# Patient Record
Sex: FEMALE | Race: WHITE | NOT HISPANIC OR LATINO | Employment: OTHER | URBAN - METROPOLITAN AREA
[De-identification: names, ages, dates, MRNs, and addresses within clinical notes are randomized per-mention and may not be internally consistent; named-entity substitution may affect disease eponyms.]

---

## 2024-08-24 ENCOUNTER — APPOINTMENT (OUTPATIENT)
Dept: GENERAL RADIOLOGY | Facility: CLINIC | Age: 44
End: 2024-08-24
Attending: EMERGENCY MEDICINE
Payer: COMMERCIAL

## 2024-08-24 ENCOUNTER — HOSPITAL ENCOUNTER (INPATIENT)
Facility: CLINIC | Age: 44
LOS: 3 days | Discharge: HOME | End: 2024-08-27
Attending: EMERGENCY MEDICINE | Admitting: HOSPITALIST
Payer: COMMERCIAL

## 2024-08-24 DIAGNOSIS — R07.9 LEFT-SIDED CHEST PAIN: ICD-10-CM

## 2024-08-24 DIAGNOSIS — J45.901 EXACERBATION OF ASTHMA, UNSPECIFIED ASTHMA SEVERITY, UNSPECIFIED WHETHER PERSISTENT: ICD-10-CM

## 2024-08-24 LAB
ALBUMIN SERPL BCG-MCNC: 4.1 G/DL (ref 3.5–5.2)
ALBUMIN UR-MCNC: 20 MG/DL
ALP SERPL-CCNC: 61 U/L (ref 40–150)
ALT SERPL W P-5'-P-CCNC: 13 U/L (ref 0–50)
ANION GAP SERPL CALCULATED.3IONS-SCNC: 13 MMOL/L (ref 7–15)
APPEARANCE UR: CLEAR
AST SERPL W P-5'-P-CCNC: 16 U/L (ref 0–45)
BACTERIA #/AREA URNS HPF: ABNORMAL /HPF
BASE EXCESS BLDV CALC-SCNC: -1 MMOL/L (ref -3–3)
BASE EXCESS BLDV CALC-SCNC: -3 MMOL/L (ref -3–3)
BASE EXCESS BLDV CALC-SCNC: -8 MMOL/L (ref -3–3)
BASOPHILS # BLD AUTO: 0.1 10E3/UL (ref 0–0.2)
BASOPHILS NFR BLD AUTO: 0 %
BILIRUB SERPL-MCNC: 0.2 MG/DL
BILIRUB UR QL STRIP: NEGATIVE
BUN SERPL-MCNC: 13.5 MG/DL (ref 6–20)
CALCIUM SERPL-MCNC: 9 MG/DL (ref 8.8–10.4)
CHLORIDE SERPL-SCNC: 100 MMOL/L (ref 98–107)
COLOR UR AUTO: ABNORMAL
CREAT SERPL-MCNC: 0.72 MG/DL (ref 0.51–0.95)
D DIMER PPP FEU-MCNC: 0.29 UG/ML FEU (ref 0–0.5)
EGFRCR SERPLBLD CKD-EPI 2021: >90 ML/MIN/1.73M2
EOSINOPHIL # BLD AUTO: 0 10E3/UL (ref 0–0.7)
EOSINOPHIL NFR BLD AUTO: 0 %
ERYTHROCYTE [DISTWIDTH] IN BLOOD BY AUTOMATED COUNT: 12.3 % (ref 10–15)
FLUAV RNA SPEC QL NAA+PROBE: NEGATIVE
FLUBV RNA RESP QL NAA+PROBE: NEGATIVE
GLUCOSE SERPL-MCNC: 118 MG/DL (ref 70–99)
GLUCOSE UR STRIP-MCNC: NEGATIVE MG/DL
HCO3 BLDV-SCNC: 17 MMOL/L (ref 21–28)
HCO3 BLDV-SCNC: 23 MMOL/L (ref 21–28)
HCO3 BLDV-SCNC: 26 MMOL/L (ref 21–28)
HCO3 SERPL-SCNC: 24 MMOL/L (ref 22–29)
HCT VFR BLD AUTO: 42.9 % (ref 35–47)
HGB BLD-MCNC: 14.7 G/DL (ref 11.7–15.7)
HGB UR QL STRIP: ABNORMAL
HOLD SPECIMEN: NORMAL
IMM GRANULOCYTES # BLD: 0 10E3/UL
IMM GRANULOCYTES NFR BLD: 0 %
KETONES UR STRIP-MCNC: 20 MG/DL
LACTATE BLD-SCNC: 2.4 MMOL/L
LACTATE BLD-SCNC: 2.8 MMOL/L
LACTATE BLD-SCNC: 5.7 MMOL/L
LACTATE SERPL-SCNC: 4.6 MMOL/L (ref 0.7–2)
LACTATE SERPL-SCNC: 6.1 MMOL/L (ref 0.7–2)
LEUKOCYTE ESTERASE UR QL STRIP: NEGATIVE
LYMPHOCYTES # BLD AUTO: 2.3 10E3/UL (ref 0.8–5.3)
LYMPHOCYTES NFR BLD AUTO: 19 %
MCH RBC QN AUTO: 31.6 PG (ref 26.5–33)
MCHC RBC AUTO-ENTMCNC: 34.3 G/DL (ref 31.5–36.5)
MCV RBC AUTO: 92 FL (ref 78–100)
MONOCYTES # BLD AUTO: 0.7 10E3/UL (ref 0–1.3)
MONOCYTES NFR BLD AUTO: 5 %
MUCOUS THREADS #/AREA URNS LPF: PRESENT /LPF
NEUTROPHILS # BLD AUTO: 9.3 10E3/UL (ref 1.6–8.3)
NEUTROPHILS NFR BLD AUTO: 75 %
NITRATE UR QL: NEGATIVE
NRBC # BLD AUTO: 0 10E3/UL
NRBC BLD AUTO-RTO: 0 /100
NT-PROBNP SERPL-MCNC: 425 PG/ML (ref 0–450)
PCO2 BLDV: 35 MM HG (ref 40–50)
PCO2 BLDV: 42 MM HG (ref 40–50)
PCO2 BLDV: 48 MM HG (ref 40–50)
PH BLDV: 7.3 [PH] (ref 7.32–7.43)
PH BLDV: 7.33 [PH] (ref 7.32–7.43)
PH BLDV: 7.34 [PH] (ref 7.32–7.43)
PH UR STRIP: 5.5 [PH] (ref 5–7)
PLATELET # BLD AUTO: 241 10E3/UL (ref 150–450)
PO2 BLDV: 26 MM HG (ref 25–47)
PO2 BLDV: 53 MM HG (ref 25–47)
PO2 BLDV: 57 MM HG (ref 25–47)
POTASSIUM SERPL-SCNC: 3.6 MMOL/L (ref 3.4–5.3)
PROCALCITONIN SERPL IA-MCNC: 0.03 NG/ML
PROT SERPL-MCNC: 6.8 G/DL (ref 6.4–8.3)
RBC # BLD AUTO: 4.65 10E6/UL (ref 3.8–5.2)
RBC URINE: 1 /HPF
RSV RNA SPEC NAA+PROBE: NEGATIVE
SAO2 % BLDV: 43 % (ref 70–75)
SAO2 % BLDV: 85 % (ref 70–75)
SAO2 % BLDV: 87 % (ref 70–75)
SARS-COV-2 RNA RESP QL NAA+PROBE: NEGATIVE
SODIUM SERPL-SCNC: 137 MMOL/L (ref 135–145)
SP GR UR STRIP: 1.03 (ref 1–1.03)
SQUAMOUS EPITHELIAL: 3 /HPF
TROPONIN T SERPL HS-MCNC: <6 NG/L
UROBILINOGEN UR STRIP-MCNC: NORMAL MG/DL
WBC # BLD AUTO: 12.4 10E3/UL (ref 4–11)
WBC URINE: 2 /HPF

## 2024-08-24 PROCEDURE — 85379 FIBRIN DEGRADATION QUANT: CPT | Performed by: EMERGENCY MEDICINE

## 2024-08-24 PROCEDURE — 250N000011 HC RX IP 250 OP 636: Performed by: EMERGENCY MEDICINE

## 2024-08-24 PROCEDURE — 84484 ASSAY OF TROPONIN QUANT: CPT | Performed by: EMERGENCY MEDICINE

## 2024-08-24 PROCEDURE — 120N000013 HC R&B IMCU

## 2024-08-24 PROCEDURE — 82803 BLOOD GASES ANY COMBINATION: CPT

## 2024-08-24 PROCEDURE — 87040 BLOOD CULTURE FOR BACTERIA: CPT | Performed by: EMERGENCY MEDICINE

## 2024-08-24 PROCEDURE — 94640 AIRWAY INHALATION TREATMENT: CPT

## 2024-08-24 PROCEDURE — 84145 PROCALCITONIN (PCT): CPT | Performed by: EMERGENCY MEDICINE

## 2024-08-24 PROCEDURE — 99285 EMERGENCY DEPT VISIT HI MDM: CPT | Mod: 25

## 2024-08-24 PROCEDURE — 36415 COLL VENOUS BLD VENIPUNCTURE: CPT | Performed by: HOSPITALIST

## 2024-08-24 PROCEDURE — 96365 THER/PROPH/DIAG IV INF INIT: CPT

## 2024-08-24 PROCEDURE — 80053 COMPREHEN METABOLIC PANEL: CPT | Performed by: EMERGENCY MEDICINE

## 2024-08-24 PROCEDURE — 99223 1ST HOSP IP/OBS HIGH 75: CPT | Performed by: HOSPITALIST

## 2024-08-24 PROCEDURE — 258N000003 HC RX IP 258 OP 636: Performed by: EMERGENCY MEDICINE

## 2024-08-24 PROCEDURE — 87637 SARSCOV2&INF A&B&RSV AMP PRB: CPT | Performed by: EMERGENCY MEDICINE

## 2024-08-24 PROCEDURE — 96368 THER/DIAG CONCURRENT INF: CPT

## 2024-08-24 PROCEDURE — 36415 COLL VENOUS BLD VENIPUNCTURE: CPT

## 2024-08-24 PROCEDURE — 96361 HYDRATE IV INFUSION ADD-ON: CPT

## 2024-08-24 PROCEDURE — 250N000011 HC RX IP 250 OP 636: Performed by: HOSPITALIST

## 2024-08-24 PROCEDURE — 83605 ASSAY OF LACTIC ACID: CPT | Performed by: HOSPITALIST

## 2024-08-24 PROCEDURE — 36415 COLL VENOUS BLD VENIPUNCTURE: CPT | Performed by: EMERGENCY MEDICINE

## 2024-08-24 PROCEDURE — 83605 ASSAY OF LACTIC ACID: CPT

## 2024-08-24 PROCEDURE — 85025 COMPLETE CBC W/AUTO DIFF WBC: CPT | Performed by: EMERGENCY MEDICINE

## 2024-08-24 PROCEDURE — 93005 ELECTROCARDIOGRAM TRACING: CPT

## 2024-08-24 PROCEDURE — 83880 ASSAY OF NATRIURETIC PEPTIDE: CPT | Performed by: EMERGENCY MEDICINE

## 2024-08-24 PROCEDURE — 87040 BLOOD CULTURE FOR BACTERIA: CPT | Performed by: HOSPITALIST

## 2024-08-24 PROCEDURE — 96375 TX/PRO/DX INJ NEW DRUG ADDON: CPT

## 2024-08-24 PROCEDURE — 81001 URINALYSIS AUTO W/SCOPE: CPT | Performed by: EMERGENCY MEDICINE

## 2024-08-24 PROCEDURE — 258N000003 HC RX IP 258 OP 636: Performed by: HOSPITALIST

## 2024-08-24 PROCEDURE — 250N000009 HC RX 250: Performed by: EMERGENCY MEDICINE

## 2024-08-24 PROCEDURE — 71046 X-RAY EXAM CHEST 2 VIEWS: CPT

## 2024-08-24 RX ORDER — LIDOCAINE 40 MG/G
CREAM TOPICAL
Status: DISCONTINUED | OUTPATIENT
Start: 2024-08-24 | End: 2024-08-27 | Stop reason: HOSPADM

## 2024-08-24 RX ORDER — SUMATRIPTAN 50 MG/1
50 TABLET, FILM COATED ORAL
COMMUNITY

## 2024-08-24 RX ORDER — ONDANSETRON 2 MG/ML
4 INJECTION INTRAMUSCULAR; INTRAVENOUS EVERY 6 HOURS PRN
Status: DISCONTINUED | OUTPATIENT
Start: 2024-08-24 | End: 2024-08-27 | Stop reason: HOSPADM

## 2024-08-24 RX ORDER — CEFTRIAXONE 1 G/1
1 INJECTION, POWDER, FOR SOLUTION INTRAMUSCULAR; INTRAVENOUS EVERY 24 HOURS
Status: DISCONTINUED | OUTPATIENT
Start: 2024-08-25 | End: 2024-08-24

## 2024-08-24 RX ORDER — IPRATROPIUM BROMIDE AND ALBUTEROL SULFATE 2.5; .5 MG/3ML; MG/3ML
3 SOLUTION RESPIRATORY (INHALATION) ONCE
Status: COMPLETED | OUTPATIENT
Start: 2024-08-24 | End: 2024-08-24

## 2024-08-24 RX ORDER — AZITHROMYCIN 500 MG/1
500 INJECTION, POWDER, LYOPHILIZED, FOR SOLUTION INTRAVENOUS ONCE
Status: COMPLETED | OUTPATIENT
Start: 2024-08-24 | End: 2024-08-24

## 2024-08-24 RX ORDER — AZITHROMYCIN 250 MG/1
250 TABLET, FILM COATED ORAL DAILY
Status: DISCONTINUED | OUTPATIENT
Start: 2024-08-25 | End: 2024-08-27 | Stop reason: HOSPADM

## 2024-08-24 RX ORDER — IPRATROPIUM BROMIDE AND ALBUTEROL SULFATE 2.5; .5 MG/3ML; MG/3ML
3 SOLUTION RESPIRATORY (INHALATION)
Status: DISCONTINUED | OUTPATIENT
Start: 2024-08-24 | End: 2024-08-25

## 2024-08-24 RX ORDER — CLOBAZAM 10 MG/1
5 TABLET ORAL 2 TIMES DAILY
COMMUNITY

## 2024-08-24 RX ORDER — PIPERACILLIN SODIUM, TAZOBACTAM SODIUM 4; .5 G/20ML; G/20ML
4.5 INJECTION, POWDER, LYOPHILIZED, FOR SOLUTION INTRAVENOUS EVERY 6 HOURS
Status: DISCONTINUED | OUTPATIENT
Start: 2024-08-24 | End: 2024-08-25

## 2024-08-24 RX ORDER — CEFTRIAXONE 2 G/1
2 INJECTION, POWDER, FOR SOLUTION INTRAMUSCULAR; INTRAVENOUS ONCE
Status: COMPLETED | OUTPATIENT
Start: 2024-08-24 | End: 2024-08-24

## 2024-08-24 RX ORDER — AMOXICILLIN 250 MG
2 CAPSULE ORAL 2 TIMES DAILY PRN
Status: DISCONTINUED | OUTPATIENT
Start: 2024-08-24 | End: 2024-08-27 | Stop reason: HOSPADM

## 2024-08-24 RX ORDER — LAMOTRIGINE 150 MG/1
150 TABLET ORAL 2 TIMES DAILY
COMMUNITY

## 2024-08-24 RX ORDER — ONDANSETRON 4 MG/1
4 TABLET, ORALLY DISINTEGRATING ORAL EVERY 6 HOURS PRN
Status: DISCONTINUED | OUTPATIENT
Start: 2024-08-24 | End: 2024-08-27 | Stop reason: HOSPADM

## 2024-08-24 RX ORDER — PRAVASTATIN SODIUM 40 MG
40 TABLET ORAL AT BEDTIME
Status: DISCONTINUED | OUTPATIENT
Start: 2024-08-24 | End: 2024-08-27 | Stop reason: HOSPADM

## 2024-08-24 RX ORDER — ACETAMINOPHEN 325 MG/1
650 TABLET ORAL EVERY 4 HOURS PRN
Status: DISCONTINUED | OUTPATIENT
Start: 2024-08-24 | End: 2024-08-27 | Stop reason: HOSPADM

## 2024-08-24 RX ORDER — LEVOCETIRIZINE DIHYDROCHLORIDE 5 MG/1
5 TABLET, FILM COATED ORAL EVERY EVENING
COMMUNITY

## 2024-08-24 RX ORDER — LIDOCAINE 40 MG/G
CREAM TOPICAL
Status: DISCONTINUED | OUTPATIENT
Start: 2024-08-24 | End: 2024-08-25

## 2024-08-24 RX ORDER — DIVALPROEX SODIUM 250 MG/1
250 TABLET, DELAYED RELEASE ORAL 2 TIMES DAILY
COMMUNITY

## 2024-08-24 RX ORDER — METHYLPREDNISOLONE SODIUM SUCCINATE 125 MG/2ML
125 INJECTION, POWDER, LYOPHILIZED, FOR SOLUTION INTRAMUSCULAR; INTRAVENOUS ONCE
Status: COMPLETED | OUTPATIENT
Start: 2024-08-24 | End: 2024-08-24

## 2024-08-24 RX ORDER — DIVALPROEX SODIUM 250 MG/1
250 TABLET, DELAYED RELEASE ORAL 2 TIMES DAILY
Status: DISCONTINUED | OUTPATIENT
Start: 2024-08-24 | End: 2024-08-27 | Stop reason: HOSPADM

## 2024-08-24 RX ORDER — LEVOCETIRIZINE DIHYDROCHLORIDE 5 MG/1
5 TABLET, FILM COATED ORAL EVERY EVENING
Status: DISCONTINUED | OUTPATIENT
Start: 2024-08-24 | End: 2024-08-27 | Stop reason: HOSPADM

## 2024-08-24 RX ORDER — FEXOFENADINE HCL 180 MG/1
180 TABLET ORAL DAILY
COMMUNITY

## 2024-08-24 RX ORDER — FEXOFENADINE HCL 180 MG/1
180 TABLET ORAL DAILY
Status: DISCONTINUED | OUTPATIENT
Start: 2024-08-25 | End: 2024-08-27 | Stop reason: HOSPADM

## 2024-08-24 RX ORDER — PRAVASTATIN SODIUM 40 MG
40 TABLET ORAL AT BEDTIME
COMMUNITY

## 2024-08-24 RX ORDER — SODIUM CHLORIDE, SODIUM LACTATE, POTASSIUM CHLORIDE, CALCIUM CHLORIDE 600; 310; 30; 20 MG/100ML; MG/100ML; MG/100ML; MG/100ML
INJECTION, SOLUTION INTRAVENOUS CONTINUOUS
Status: DISCONTINUED | OUTPATIENT
Start: 2024-08-24 | End: 2024-08-25

## 2024-08-24 RX ORDER — AMOXICILLIN 250 MG
1 CAPSULE ORAL 2 TIMES DAILY PRN
Status: DISCONTINUED | OUTPATIENT
Start: 2024-08-24 | End: 2024-08-27 | Stop reason: HOSPADM

## 2024-08-24 RX ORDER — METHYLPREDNISOLONE SODIUM SUCCINATE 125 MG/2ML
80 INJECTION, POWDER, LYOPHILIZED, FOR SOLUTION INTRAMUSCULAR; INTRAVENOUS EVERY 24 HOURS
Status: DISCONTINUED | OUTPATIENT
Start: 2024-08-25 | End: 2024-08-25

## 2024-08-24 RX ORDER — ACETAMINOPHEN 650 MG/1
650 SUPPOSITORY RECTAL EVERY 4 HOURS PRN
Status: DISCONTINUED | OUTPATIENT
Start: 2024-08-24 | End: 2024-08-27 | Stop reason: HOSPADM

## 2024-08-24 RX ADMIN — METHYLPREDNISOLONE SODIUM SUCCINATE 125 MG: 125 INJECTION, POWDER, FOR SOLUTION INTRAMUSCULAR; INTRAVENOUS at 15:11

## 2024-08-24 RX ADMIN — IPRATROPIUM BROMIDE AND ALBUTEROL SULFATE 3 ML: .5; 3 SOLUTION RESPIRATORY (INHALATION) at 15:11

## 2024-08-24 RX ADMIN — SODIUM CHLORIDE 1000 ML: 9 INJECTION, SOLUTION INTRAVENOUS at 15:11

## 2024-08-24 RX ADMIN — IPRATROPIUM BROMIDE AND ALBUTEROL SULFATE 3 ML: .5; 3 SOLUTION RESPIRATORY (INHALATION) at 17:06

## 2024-08-24 RX ADMIN — CEFTRIAXONE SODIUM 2 G: 2 INJECTION, POWDER, FOR SOLUTION INTRAMUSCULAR; INTRAVENOUS at 17:31

## 2024-08-24 RX ADMIN — AZITHROMYCIN MONOHYDRATE 500 MG: 500 INJECTION, POWDER, LYOPHILIZED, FOR SOLUTION INTRAVENOUS at 18:22

## 2024-08-24 RX ADMIN — SODIUM CHLORIDE 2000 ML: 9 INJECTION, SOLUTION INTRAVENOUS at 17:31

## 2024-08-24 RX ADMIN — SODIUM CHLORIDE, POTASSIUM CHLORIDE, SODIUM LACTATE AND CALCIUM CHLORIDE 1000 ML: 600; 310; 30; 20 INJECTION, SOLUTION INTRAVENOUS at 21:40

## 2024-08-24 RX ADMIN — PIPERACILLIN AND TAZOBACTAM 4.5 G: 4; .5 INJECTION, POWDER, FOR SOLUTION INTRAVENOUS at 21:39

## 2024-08-24 ASSESSMENT — ACTIVITIES OF DAILY LIVING (ADL)
ADLS_ACUITY_SCORE: 35

## 2024-08-24 NOTE — ED PROVIDER NOTES
Emergency Department Note      History of Present Illness     Chief Complaint  Chest Pain and Shortness of Breath    HPI  Windy Douglas is a 43 year old female with history of asthma who presents to the ED via EMS for evaluation of chest pain and shortness of breath. She reports sudden left sided chest pain two hours ago and shortness of breath. She states having URI symptoms for the past few days and has been coughing up yellow phlegm. She endorses wheezing but denies leg swelling or abdominal pain. Patient is a smoker. Patient is from [place] and was recently at the [facility].     Independent Historian  None    Review of External Notes  None  Past Medical History   Medical History and Problem List  Asthma    Medications  Depakote    Surgical History   No past surgical history on file.  Physical Exam   Patient Vitals for the past 24 hrs:   BP Temp Temp src Pulse Resp SpO2   08/24/24 1600 114/63 -- -- 100 -- 96 %   08/24/24 1540 -- -- -- 105 18 97 %   08/24/24 1530 110/62 -- -- 103 15 97 %   08/24/24 1515 -- -- -- 99 16 99 %   08/24/24 1500 119/70 -- -- 89 13 97 %   08/24/24 1445 121/69 -- -- 94 10 96 %   08/24/24 1430 115/71 -- -- 103 21 97 %   08/24/24 1429 115/71 -- -- 101 25 97 %   08/24/24 1423 114/70 -- -- 105 25 94 %   08/24/24 1421 -- 98.5  F (36.9  C) Oral 111 20 93 %     Physical Exam  Eyes:  The pupils are equal and round    Conjunctivae and sclerae are normal  ENT:    The nose is normal    Pinnae are normal  CV:  Tachycardic and regular    No edema  Resp:  Expiratory wheezing bilaterally    No rales  GI:  Abdomen is soft and non-tender, there is no rigidity    No distension    No rebound tenderness   MS:  Normal muscular tone    No asymmetric leg swelling  Skin:  No rash or acute skin lesions noted  Neuro:   Awake, alert.      Speech is normal and fluent.    Face is symmetric.     Moves all extremities    Diagnostics   Lab Results   Labs Ordered and Resulted from Time of ED Arrival to Time of  ED Departure   COMPREHENSIVE METABOLIC PANEL - Abnormal       Result Value    Sodium 137      Potassium 3.6      Carbon Dioxide (CO2) 24      Anion Gap 13      Urea Nitrogen 13.5      Creatinine 0.72      GFR Estimate >90      Calcium 9.0      Chloride 100      Glucose 118 (*)     Alkaline Phosphatase 61      AST 16      ALT 13      Protein Total 6.8      Albumin 4.1      Bilirubin Total 0.2     CBC WITH PLATELETS AND DIFFERENTIAL - Abnormal    WBC Count 12.4 (*)     RBC Count 4.65      Hemoglobin 14.7      Hematocrit 42.9      MCV 92      MCH 31.6      MCHC 34.3      RDW 12.3      Platelet Count 241      % Neutrophils 75      % Lymphocytes 19      % Monocytes 5      % Eosinophils 0      % Basophils 0      % Immature Granulocytes 0      NRBCs per 100 WBC 0      Absolute Neutrophils 9.3 (*)     Absolute Lymphocytes 2.3      Absolute Monocytes 0.7      Absolute Eosinophils 0.0      Absolute Basophils 0.1      Absolute Immature Granulocytes 0.0      Absolute NRBCs 0.0     ISTAT GASES LACTATE VENOUS POCT - Abnormal    Lactic Acid POCT 2.4 (*)     Bicarbonate Venous POCT 26      O2 Sat, Venous POCT 43 (*)     pCO2 Venous POCT 48      pH Venous POCT 7.33      pO2 Venous POCT 26      Base Excess/Deficit (+/-) POCT -1.0     TROPONIN T, HIGH SENSITIVITY - Normal    Troponin T, High Sensitivity <6     INFLUENZA A/B, RSV, & SARS-COV2 PCR - Normal    Influenza A PCR Negative      Influenza B PCR Negative      RSV PCR Negative      SARS CoV2 PCR Negative     D DIMER QUANTITATIVE - Normal    D-Dimer Quantitative 0.29     LACTIC ACID WHOLE BLOOD   BLOOD CULTURE     Imaging  Chest XR,  PA & LAT   Final Result   IMPRESSION: Negative chest.        Report per radiology    EKG   ECG taken at 1419, ECG read at 1430  Sinus tachycardia   ST & T wave abnormality, consider inferior ischemia  ST & T wave abnormality, consider anterolateral ischemia    Rate 108 bpm. NY interval 154 ms. QRS duration 92 ms. QT/QTc 352/471 ms. P-R-T axes 66 60  -82.     Independent Interpretation  CXR: No infiltrate.  ED Course    Medications Administered  Medications   ipratropium - albuterol 0.5 mg/2.5 mg/3 mL (DUONEB) neb solution 3 mL (has no administration in time range)   ipratropium - albuterol 0.5 mg/2.5 mg/3 mL (DUONEB) neb solution 3 mL (3 mLs Nebulization $Given 8/24/24 1511)   methylPREDNISolone Na Suc (solu-MEDROL) injection 125 mg (125 mg Intravenous $Given 8/24/24 1511)   sodium chloride 0.9% BOLUS 1,000 mL (1,000 mLs Intravenous $New Bag 8/24/24 1511)     Procedures  Procedures     Discussion of Management  Admitting Hospitalist, Dr. Perez    Optional/Additional Documentation  None    ED Course  ED Course as of 08/24/24 1917   Sat Aug 24, 2024   1432 I obtained history and examined the patient as noted above.    1631 I rechecked the patient and explained findings. Patient is feeling better.   1723 I rechecked the patient and explained findings.      Medical Decision Making / Diagnosis   CMS Diagnoses: The patient has signs of Severe Sepsis   If one the following conditions is present, a 30 mL/kg bolus is recommended as part of the 6 hour bundle (IBW can be used for BMI >30, or document refusal/contraindication):      1.   Initial hypotension  defined as 2 bps < 90 or map < 65 in the 6hrs before or 3hrs after time zero.     2.  Lactate >4.      The patient has signs of Severe Sepsis as evidenced by:    1. 2 SIRS criteria, AND  2. Suspected infection, AND   3. Organ dysfunction: Lactic Acidosis with value >2.0    Time severe sepsis diagnosis confirmed: 1709 08/24/24 as this was the time when Lactate resulted, and the level was > 2.0    3 Hour Severe Sepsis Bundle Completion:    1. Initial Lactic Acid Result:   Recent Labs   Lab Test 08/24/24 1709 08/24/24  1425   LACT 2.8* 2.4*     2. Blood Cultures before Antibiotics: Yes  Note: Due to a national blood culture bottle shortage, reduced blood cultures may have been drawn on this patient.  3. Broad Spectrum  Antibiotics Administered:  yes       Anti-infectives (From admission through now)      Start     Dose/Rate Route Frequency Ordered Stop    08/24/24 1720  cefTRIAXone (ROCEPHIN) 2 g vial to attach to  ml bag for ADULTS or NS 50 ml bag for PEDS         2 g  over 30 Minutes Intravenous ONCE 08/24/24 1718 08/24/24 1825    08/24/24 1720  azithromycin (ZITHROMAX) 500 mg vial to attach to  mL bag         500 mg  over 1 Hours Intravenous ONCE 08/24/24 1718              4. Is initial hypotension present?     No (IV fluid bolus NOT required). IV Fluid volume administered: 3,000 mL    Severe Sepsis reassessment:  1. Repeat Lactic Acid Level within 6 hours of time zero: 2.8  2. MAP>65 after initial IVF bolus, will continue to monitor fluid status and vital signs      MIPS     None    MDM  Windy Douglas is a 43 year old female who presents to the emergency department with concerns about shortness of breath and left-sided chest pain.  She has had a URI symptoms that started at 2 or 3 days ago.  She is traveling here from [place].  This morning she woke with some mild left-sided chest pain that worsened about 2 hours prior to arrival, so she came here to the emergency department.  On exam she has wheezing through both lung fields.  EKG shows a sinus rhythm without acute ischemic changes.  She was given additional nebulizer to what EMS had already provided.  She noted improvement of her chest discomfort after the initial nebulizer.  She was also given Solu-Medrol.  Lactic acid level was obtained and was elevated.  She was given IV fluids and upon recheck the lactic acid level had increased.  Initial suspicion was that her symptoms were related to her viral syndrome, but upon repeat lactic acid level rising despite IV fluids, she was given broad-spectrum antibiotics for possibility of sepsis.  She was given additional nebulizer which further helped reduce her pain.  Chest x-ray did not reveal any infiltrate.   Urinalysis was ordered but not provided.  Given the rising lactic acid level recommended admission to the hospital.  Spoke with the hospitalist agreed except the patient as an admission.  Procalcitonin was added on and was found to be low.    Disposition  The patient was admitted to the hospital.     ICD-10 Codes:    ICD-10-CM    1. Left-sided chest pain  R07.9       2. Exacerbation of asthma, unspecified asthma severity, unspecified whether persistent  J45.901            Discharge Medications  New Prescriptions    No medications on file     Scribe Disclosure:  I, Citlaly Mcgill, am serving as a scribe at 2:53 PM on 8/24/2024 to document services personally performed by Veto Hassan MD based on my observations and the provider's statements to me.      Veto Hassan MD  08/24/24 1923

## 2024-08-24 NOTE — ED NOTES
[facility]  ED Nurse Handoff Report    ED Chief complaint: Chest Pain and Shortness of Breath      ED Diagnosis:   Final diagnoses:   None       Code Status: not addressed at this time    Allergies:   Allergies   Allergen Reactions    Codeine     Doxycycline     Seasonal Allergies     Strawberry (Diagnostic)     Zoloft [Sertraline]        Patient Story: Pt BIBA from [facility], pt from [place], sudden SOB and CP this AM, no trauma noted, EMS gave 1 duoneb without improvement, pt was recently at [facility], flight from [place], ABCD intact.        Focused Assessment:  A&Ox4. Tachy , all other vitals stable  on RA. Up SBA. Significant other in room. Productive congested cough with some wheezing .  Labs Ordered and Resulted from Time of ED Arrival to Time of ED Departure   COMPREHENSIVE METABOLIC PANEL - Abnormal       Result Value    Sodium 137      Potassium 3.6      Carbon Dioxide (CO2) 24      Anion Gap 13      Urea Nitrogen 13.5      Creatinine 0.72      GFR Estimate >90      Calcium 9.0      Chloride 100      Glucose 118 (*)     Alkaline Phosphatase 61      AST 16      ALT 13      Protein Total 6.8      Albumin 4.1      Bilirubin Total 0.2     CBC WITH PLATELETS AND DIFFERENTIAL - Abnormal    WBC Count 12.4 (*)     RBC Count 4.65      Hemoglobin 14.7      Hematocrit 42.9      MCV 92      MCH 31.6      MCHC 34.3      RDW 12.3      Platelet Count 241      % Neutrophils 75      % Lymphocytes 19      % Monocytes 5      % Eosinophils 0      % Basophils 0      % Immature Granulocytes 0      NRBCs per 100 WBC 0      Absolute Neutrophils 9.3 (*)     Absolute Lymphocytes 2.3      Absolute Monocytes 0.7      Absolute Eosinophils 0.0      Absolute Basophils 0.1      Absolute Immature Granulocytes 0.0      Absolute NRBCs 0.0     ISTAT GASES LACTATE VENOUS POCT - Abnormal    Lactic Acid POCT 2.4 (*)     Bicarbonate Venous POCT 26      O2 Sat, Venous POCT 43 (*)     pCO2 Venous POCT 48      pH Venous POCT  7.33      pO2 Venous POCT 26      Base Excess/Deficit (+/-) POCT -1.0     ISTAT GASES LACTATE VENOUS POCT - Abnormal    Lactic Acid POCT 2.8 (*)     Bicarbonate Venous POCT 23      O2 Sat, Venous POCT 85 (*)     pCO2 Venous POCT 42      pH Venous POCT 7.34      pO2 Venous POCT 53 (*)     Base Excess/Deficit (+/-) POCT -3.0     TROPONIN T, HIGH SENSITIVITY - Normal    Troponin T, High Sensitivity <6     INFLUENZA A/B, RSV, & SARS-COV2 PCR - Normal    Influenza A PCR Negative      Influenza B PCR Negative      RSV PCR Negative      SARS CoV2 PCR Negative     D DIMER QUANTITATIVE - Normal    D-Dimer Quantitative 0.29     BLOOD CULTURE      Chest XR,  PA & LAT   Final Result   IMPRESSION: Negative chest.           Treatments and/or interventions provided: see MAR  Patient's response to treatments and/or interventions: cont to assess    To be done/followed up on inpatient unit:  cont with admitting MD orders    Does this patient have any cognitive concerns?:  none needed    Activity level - Baseline/Home:  Independent  Activity Level - Current:   Stand with Assist    Patient's Preferred language: English   Needed?: No    Isolation: None  Infection: Not Applicable  Patient tested for COVID 19 prior to admission: YES  Bariatric?: No    Vital Signs:   Vitals:    08/24/24 1530 08/24/24 1540 08/24/24 1600 08/24/24 1700   BP: 110/62  114/63 108/68   Pulse: 103 105 100 97   Resp: 15 18     Temp:       TempSrc:       SpO2: 97% 97% 96% 94%       Cardiac Rhythm:Cardiac Rhythm: Sinus tachycardia    Was the PSS-3 completed:   Yes  What interventions are required if any?               Family Comments: at bedside  OBS brochure/video discussed/provided to patient/family: Yes              For the majority of the shift this patient's behavior was Green.   Behavioral interventions performed were none needed.    ED NURSE PHONE NUMBER: [phone]

## 2024-08-24 NOTE — H&P
[facility]    History and Physical  Hospitalist       Date of Admission:  8/24/2024    Assessment & Plan   Windy Douglas is a 43 year old female visiting from [place] since 8/23/2024 presents with dyspnea and cough.        Dyspnea and cough  Sepsis [criteria tachycardia, hypoxia, elevated LA]  Pneumonitis, viral versus CAP.  Bronchospasms, question history of asthma  Smoker.  Patient flew to [place] to [facility] 8/22/2024 and felt well.  The next day she noted onset of sore throat, nasal congestion and cough.  On that day she was at the [facility].  Today progressive dyspnea and cough.  Patient states in the distant past she was treated for asthma however not on regular inhalers.  She is a smoker of a half a pack per day.  No other chronic medical illness except for seizure disorder on Lamictal and Depakote.  No chest pain.  In ED troponins negative.  D-dimer negative.  Hypoxic at 1.5 L O2 since then to room air after DuoNeb x 2.  Bronchospasms present on transport and in ED, improved with DuoNeb.  Given Solu-Medrol 125 mg.  Initial lactic acid 2.4, after 2 L 2.8.  Tachycardia has resolved.  Chest x-ray negative.  COVID, RSV, flu negative.  WBC 12.4.  Started empirically on Rocephin and azithromycin for CAP.  UA pending.  Blood cultures pending.  BNP and procalcitonin pending.  -Continue DuoNeb 4 times daily/RT, continue IV steroids for bronchospasms, tomorrow 80 mg, defer to attending team taper thereafter.  -Continue empiric Rocephin and azithromycin however as above CXR negative.  May be viral URI precipitating bronchospasms or viral pneumonitis.  -follow-up BC, UC  -Currently on room air, monitor  -Discontinue tobacco.  -Repeat LA after additional liter IVF.  -Continue IVF until adequate p.o.'s pending BNP.    ADDENDUM 21:30:  repeat LA after 3 liters fluids in ED increased to 6.1 from 2.4.  Patient continues to look well, SBP WNL.  Remains somewhat tachycardic at 100.   Supplemental O2 at 1 L, continue intermittent cough.  Per nursing no further bronchospasms.  Differential for rising LA is increasing sepsis however clinically not consistent.  System review negative for focal bacterial source by CXR, UA, empirically treated with Rocephin and azithromycin for CAP however procalcitonin 0.03, therefore suspected viral pneumonitis precipitating bronchospasms.  No indications for other causes for rising LA > although patient with seizure disorder no indications of seizure grand or petit; no evidence of bowel infarction, she had received DuoNeb with albuterol causing possible increased LA/MA therefore hold further DuoNeb.  ID consult placed to determine if increased LA can be of viral etiology.  In the meantime, ordered sepsis protocol including bolus fluid per protocol followed by LR at 150 cc/hour [BNP normal], broaden antibiotics to Zosyn instead of Rocephin [continue azithromycin], changed to IMC status, repeat LA at 2 hours, close monitor.  Discussed with patient who continues to look well.    Seizure disorder  Continue PTA Lamictal and Depakote once verified.    Menopausal secondary to hysterectomy  -May continue PTA Premarin once verified to prevent withdrawal bleed, as above D-dimer negative .    DVT Prophylaxis: Pneumatic Compression Devices  Code Status: Full Code.  Discussed and confirmed on admission    Mayte Perez MD    Total time 75 minutes for today 8/24/2024 :   time consisted of the following, assuming Patient care with review of records including labs, imaging results, medications, interdisciplinary notes; examination of Patient;  and completing documentation and orders.  Care Management included counseling/discussion with Patient and  regarding current condition including dyspnea, cough, bronchospasms, sepsis and Coordination of Care time with Nursing regarding care plan, management and surveillance.     Primary Care Physician   Physician No Ref-Primary  visiting [place], resident of [place]    Chief Complaint   Dyspnea and cough    History from patient, , ED provider    History of Present Illness   Windy Douglas is a 43 year old female visiting from [place] since 8/23/2024 presents with dyspnea and cough.  Patient flew to [place] to [facility] 8/22/2024 and felt well.  The next day she noted onset of sore throat, nasal congestion and cough.  [place]  Today progressive dyspnea and cough.  Patient states in the distant past she was treated for asthma however not on regular inhalers.  She is a smoker of a half a pack per day.  No other chronic medical illness except for seizure disorder on Lamictal and Depakote.  No chest pain.  In ED troponins negative.  D-dimer negative.  Hypoxic at 1.5 L O2 since then to room air after DuoNeb x 2.  Bronchospasms present on transport and in ED, improved with DuoNeb.  Given Solu-Medrol 125 mg.  Initial lactic acid 2.4, after 2 L 2.8.  Tachycardia has resolved.  Chest x-ray negative.  COVID, RSV, flu negative.  WBC 12.4.  Started empirically on Rocephin and azithromycin for CAP.  UA pending.  Blood cultures pending.  BNP and procalcitonin pending.  Remainder of HPI as above.      Past Medical History    Seizure disorder.      Prior to Admission Medications   None     Allergies   Allergies   Allergen Reactions    Codeine     Doxycycline     Seasonal Allergies     Strawberry (Diagnostic)     Zoloft [Sertraline]        Social History   Visiting [place], lives in [place] with  and children.  Does not work.      Review of Systems   The 10 point Review of Systems is negative other than noted in the HPI     Physical Exam   Temp: 98.5  F (36.9  C) Temp src: Oral BP: 108/68 Pulse: 97   now on RA    General/Constitutional:   modest distress with DB and 2' cough and bronchospasm, awake, calm, cooperative    Chest/Respiratory: on RA, prominent bronchospasm and cough with forced expiratory wheeze,  however lung exchange all lung fields..  Cardiovascular:  regular, no murmur appreciated.  LE edema none  Gastrointestinal/Abdomen:  soft, nontender, no rebound, guarding or other peritoneal signs.  Neuro.  Gross motor tested, nonfocal,  Psych oriented, affect calm    Data     Recent Labs   Lab 08/24/24  1423   WBC 12.4*   HGB 14.7   MCV 92         POTASSIUM 3.6   CHLORIDE 100   CO2 24   BUN 13.5   CR 0.72   ANIONGAP 13   LINDSAY 9.0   *   ALBUMIN 4.1   PROTTOTAL 6.8   BILITOTAL 0.2   ALKPHOS 61   ALT 13   AST 16       Recent Results (from the past 24 hour(s))   Chest XR,  PA & LAT    Narrative    EXAM: XR CHEST 2 VIEWS  LOCATION: [facility]  DATE: 8/24/2024    INDICATION: cough, sob, left chest pain  COMPARISON: None.      Impression    IMPRESSION: Negative chest.

## 2024-08-24 NOTE — ED TRIAGE NOTES
Pt BIBA from [facility], pt from [place], sudden SOB and CP this AM, no trauma noted, EMS gave 1 duoneb without improvement, [place], ABCD intact.       Triage Assessment (Adult)       Row Name 08/24/24 [id number]          Triage Assessment    Airway WDL WDL        Respiratory WDL    Respiratory WDL X  SOB        Skin Circulation/Temperature WDL    Skin Circulation/Temperature WDL WDL        Cardiac WDL    Cardiac WDL X     Cardiac Rhythm ST        Peripheral/Neurovascular WDL    Peripheral Neurovascular WDL WDL        Cognitive/Neuro/Behavioral WDL    Cognitive/Neuro/Behavioral WDL WDL

## 2024-08-25 LAB
ANION GAP SERPL CALCULATED.3IONS-SCNC: 2 MMOL/L (ref 7–15)
ATRIAL RATE - MUSE: 108 BPM
ATRIAL RATE - MUSE: 79 BPM
BUN SERPL-MCNC: 7.7 MG/DL (ref 6–20)
C PNEUM DNA SPEC QL NAA+PROBE: NOT DETECTED
CALCIUM SERPL-MCNC: 8.6 MG/DL (ref 8.8–10.4)
CHLORIDE SERPL-SCNC: 106 MMOL/L (ref 98–107)
CREAT SERPL-MCNC: 0.61 MG/DL (ref 0.51–0.95)
DIASTOLIC BLOOD PRESSURE - MUSE: NORMAL MMHG
DIASTOLIC BLOOD PRESSURE - MUSE: NORMAL MMHG
EGFRCR SERPLBLD CKD-EPI 2021: >90 ML/MIN/1.73M2
ERYTHROCYTE [DISTWIDTH] IN BLOOD BY AUTOMATED COUNT: 12.9 % (ref 10–15)
FLUAV H1 2009 PAND RNA SPEC QL NAA+PROBE: NOT DETECTED
FLUAV H1 RNA SPEC QL NAA+PROBE: NOT DETECTED
FLUAV H3 RNA SPEC QL NAA+PROBE: NOT DETECTED
FLUAV RNA SPEC QL NAA+PROBE: NOT DETECTED
FLUBV RNA SPEC QL NAA+PROBE: NOT DETECTED
GLUCOSE SERPL-MCNC: 137 MG/DL (ref 70–99)
HADV DNA SPEC QL NAA+PROBE: NOT DETECTED
HCO3 SERPL-SCNC: 32 MMOL/L (ref 22–29)
HCOV PNL SPEC NAA+PROBE: NOT DETECTED
HCT VFR BLD AUTO: 36.4 % (ref 35–47)
HGB BLD-MCNC: 12.2 G/DL (ref 11.7–15.7)
HMPV RNA SPEC QL NAA+PROBE: NOT DETECTED
HPIV1 RNA SPEC QL NAA+PROBE: NOT DETECTED
HPIV2 RNA SPEC QL NAA+PROBE: NOT DETECTED
HPIV3 RNA SPEC QL NAA+PROBE: NOT DETECTED
HPIV4 RNA SPEC QL NAA+PROBE: NOT DETECTED
INTERPRETATION ECG - MUSE: NORMAL
INTERPRETATION ECG - MUSE: NORMAL
LACTATE SERPL-SCNC: 1.3 MMOL/L (ref 0.7–2)
LACTATE SERPL-SCNC: 2 MMOL/L (ref 0.7–2)
M PNEUMO DNA SPEC QL NAA+PROBE: NOT DETECTED
MCH RBC QN AUTO: 31 PG (ref 26.5–33)
MCHC RBC AUTO-ENTMCNC: 33.5 G/DL (ref 31.5–36.5)
MCV RBC AUTO: 93 FL (ref 78–100)
P AXIS - MUSE: 64 DEGREES
P AXIS - MUSE: 66 DEGREES
PLATELET # BLD AUTO: 216 10E3/UL (ref 150–450)
POTASSIUM SERPL-SCNC: 4.3 MMOL/L (ref 3.4–5.3)
PR INTERVAL - MUSE: 154 MS
PR INTERVAL - MUSE: 168 MS
QRS DURATION - MUSE: 86 MS
QRS DURATION - MUSE: 92 MS
QT - MUSE: 352 MS
QT - MUSE: 380 MS
QTC - MUSE: 435 MS
QTC - MUSE: 471 MS
R AXIS - MUSE: 60 DEGREES
R AXIS - MUSE: 66 DEGREES
RBC # BLD AUTO: 3.93 10E6/UL (ref 3.8–5.2)
RSV RNA SPEC QL NAA+PROBE: NOT DETECTED
RSV RNA SPEC QL NAA+PROBE: NOT DETECTED
RV+EV RNA SPEC QL NAA+PROBE: DETECTED
SODIUM SERPL-SCNC: 140 MMOL/L (ref 135–145)
SYSTOLIC BLOOD PRESSURE - MUSE: NORMAL MMHG
SYSTOLIC BLOOD PRESSURE - MUSE: NORMAL MMHG
T AXIS - MUSE: -1 DEGREES
T AXIS - MUSE: -82 DEGREES
TROPONIN T SERPL HS-MCNC: 8 NG/L
VENTRICULAR RATE- MUSE: 108 BPM
VENTRICULAR RATE- MUSE: 79 BPM
WBC # BLD AUTO: 14 10E3/UL (ref 4–11)

## 2024-08-25 PROCEDURE — 250N000011 HC RX IP 250 OP 636: Performed by: HOSPITALIST

## 2024-08-25 PROCEDURE — 120N000001 HC R&B MED SURG/OB

## 2024-08-25 PROCEDURE — 36415 COLL VENOUS BLD VENIPUNCTURE: CPT | Performed by: HOSPITALIST

## 2024-08-25 PROCEDURE — 94640 AIRWAY INHALATION TREATMENT: CPT | Mod: 76

## 2024-08-25 PROCEDURE — 94640 AIRWAY INHALATION TREATMENT: CPT

## 2024-08-25 PROCEDURE — 87633 RESP VIRUS 12-25 TARGETS: CPT | Performed by: INTERNAL MEDICINE

## 2024-08-25 PROCEDURE — 80048 BASIC METABOLIC PNL TOTAL CA: CPT | Performed by: HOSPITALIST

## 2024-08-25 PROCEDURE — 99233 SBSQ HOSP IP/OBS HIGH 50: CPT | Performed by: INTERNAL MEDICINE

## 2024-08-25 PROCEDURE — 250N000013 HC RX MED GY IP 250 OP 250 PS 637: Performed by: HOSPITALIST

## 2024-08-25 PROCEDURE — 84484 ASSAY OF TROPONIN QUANT: CPT | Performed by: INTERNAL MEDICINE

## 2024-08-25 PROCEDURE — 250N000011 HC RX IP 250 OP 636: Performed by: INTERNAL MEDICINE

## 2024-08-25 PROCEDURE — 250N000009 HC RX 250: Performed by: INTERNAL MEDICINE

## 2024-08-25 PROCEDURE — 258N000003 HC RX IP 258 OP 636: Performed by: HOSPITALIST

## 2024-08-25 PROCEDURE — 85027 COMPLETE CBC AUTOMATED: CPT | Performed by: HOSPITALIST

## 2024-08-25 PROCEDURE — 250N000013 HC RX MED GY IP 250 OP 250 PS 637

## 2024-08-25 PROCEDURE — 250N000013 HC RX MED GY IP 250 OP 250 PS 637: Performed by: INTERNAL MEDICINE

## 2024-08-25 PROCEDURE — 83605 ASSAY OF LACTIC ACID: CPT | Performed by: HOSPITALIST

## 2024-08-25 PROCEDURE — 258N000003 HC RX IP 258 OP 636: Performed by: INTERNAL MEDICINE

## 2024-08-25 PROCEDURE — 93005 ELECTROCARDIOGRAM TRACING: CPT

## 2024-08-25 PROCEDURE — 999N000157 HC STATISTIC RCP TIME EA 10 MIN

## 2024-08-25 PROCEDURE — 93010 ELECTROCARDIOGRAM REPORT: CPT | Performed by: INTERNAL MEDICINE

## 2024-08-25 RX ORDER — NALOXONE HYDROCHLORIDE 0.4 MG/ML
0.4 INJECTION, SOLUTION INTRAMUSCULAR; INTRAVENOUS; SUBCUTANEOUS
Status: DISCONTINUED | OUTPATIENT
Start: 2024-08-25 | End: 2024-08-27 | Stop reason: HOSPADM

## 2024-08-25 RX ORDER — BENZONATATE 100 MG/1
100 CAPSULE ORAL 3 TIMES DAILY
Status: DISCONTINUED | OUTPATIENT
Start: 2024-08-25 | End: 2024-08-27 | Stop reason: HOSPADM

## 2024-08-25 RX ORDER — CLOBAZAM 2.5 MG/ML
5 SUSPENSION ORAL 2 TIMES DAILY
Status: DISCONTINUED | OUTPATIENT
Start: 2024-08-25 | End: 2024-08-25

## 2024-08-25 RX ORDER — CEFTRIAXONE 2 G/1
2 INJECTION, POWDER, FOR SOLUTION INTRAMUSCULAR; INTRAVENOUS EVERY 24 HOURS
Status: DISCONTINUED | OUTPATIENT
Start: 2024-08-25 | End: 2024-08-25

## 2024-08-25 RX ORDER — CEFDINIR 300 MG/1
300 CAPSULE ORAL EVERY 12 HOURS SCHEDULED
Status: DISCONTINUED | OUTPATIENT
Start: 2024-08-26 | End: 2024-08-25

## 2024-08-25 RX ORDER — SODIUM CHLORIDE, SODIUM LACTATE, POTASSIUM CHLORIDE, CALCIUM CHLORIDE 600; 310; 30; 20 MG/100ML; MG/100ML; MG/100ML; MG/100ML
INJECTION, SOLUTION INTRAVENOUS CONTINUOUS
Status: ACTIVE | OUTPATIENT
Start: 2024-08-25 | End: 2024-08-25

## 2024-08-25 RX ORDER — HYDROMORPHONE HYDROCHLORIDE 2 MG/1
2-4 TABLET ORAL
Status: DISCONTINUED | OUTPATIENT
Start: 2024-08-25 | End: 2024-08-27 | Stop reason: HOSPADM

## 2024-08-25 RX ORDER — GUAIFENESIN 600 MG/1
600 TABLET, EXTENDED RELEASE ORAL 2 TIMES DAILY
Status: DISCONTINUED | OUTPATIENT
Start: 2024-08-25 | End: 2024-08-27 | Stop reason: HOSPADM

## 2024-08-25 RX ORDER — CLOBAZAM 2.5 MG/ML
5 SUSPENSION ORAL ONCE
Status: COMPLETED | OUTPATIENT
Start: 2024-08-25 | End: 2024-08-25

## 2024-08-25 RX ORDER — NALOXONE HYDROCHLORIDE 0.4 MG/ML
0.2 INJECTION, SOLUTION INTRAMUSCULAR; INTRAVENOUS; SUBCUTANEOUS
Status: DISCONTINUED | OUTPATIENT
Start: 2024-08-25 | End: 2024-08-27 | Stop reason: HOSPADM

## 2024-08-25 RX ORDER — CLOBAZAM 2.5 MG/ML
5 SUSPENSION ORAL 2 TIMES DAILY
Status: DISCONTINUED | OUTPATIENT
Start: 2024-08-25 | End: 2024-08-27 | Stop reason: HOSPADM

## 2024-08-25 RX ORDER — IPRATROPIUM BROMIDE AND ALBUTEROL SULFATE 2.5; .5 MG/3ML; MG/3ML
3 SOLUTION RESPIRATORY (INHALATION)
Status: DISCONTINUED | OUTPATIENT
Start: 2024-08-25 | End: 2024-08-27 | Stop reason: HOSPADM

## 2024-08-25 RX ORDER — IBUPROFEN 400 MG/1
400 TABLET, FILM COATED ORAL EVERY 6 HOURS PRN
Status: DISCONTINUED | OUTPATIENT
Start: 2024-08-25 | End: 2024-08-27 | Stop reason: HOSPADM

## 2024-08-25 RX ORDER — HYDROMORPHONE HYDROCHLORIDE 2 MG/1
2 TABLET ORAL
Status: DISCONTINUED | OUTPATIENT
Start: 2024-08-25 | End: 2024-08-25

## 2024-08-25 RX ORDER — METHYLPREDNISOLONE SODIUM SUCCINATE 125 MG/2ML
80 INJECTION, POWDER, LYOPHILIZED, FOR SOLUTION INTRAMUSCULAR; INTRAVENOUS EVERY 8 HOURS SCHEDULED
Status: DISCONTINUED | OUTPATIENT
Start: 2024-08-25 | End: 2024-08-26

## 2024-08-25 RX ADMIN — LEVOCETIRIZINE DIHYDROCHLORIDE 5 MG: 5 TABLET ORAL at 00:10

## 2024-08-25 RX ADMIN — CEFTRIAXONE SODIUM 2 G: 2 INJECTION, POWDER, FOR SOLUTION INTRAMUSCULAR; INTRAVENOUS at 14:18

## 2024-08-25 RX ADMIN — LEVOCETIRIZINE DIHYDROCHLORIDE 5 MG: 5 TABLET ORAL at 20:21

## 2024-08-25 RX ADMIN — CLOBAZAM 5 MG: 2.5 SUSPENSION ORAL at 09:39

## 2024-08-25 RX ADMIN — CLOBAZAM 5 MG: 2.5 SUSPENSION ORAL at 00:50

## 2024-08-25 RX ADMIN — IPRATROPIUM BROMIDE AND ALBUTEROL SULFATE 3 ML: .5; 3 SOLUTION RESPIRATORY (INHALATION) at 15:03

## 2024-08-25 RX ADMIN — SODIUM CHLORIDE, POTASSIUM CHLORIDE, SODIUM LACTATE AND CALCIUM CHLORIDE: 600; 310; 30; 20 INJECTION, SOLUTION INTRAVENOUS at 07:44

## 2024-08-25 RX ADMIN — IPRATROPIUM BROMIDE AND ALBUTEROL SULFATE 3 ML: .5; 3 SOLUTION RESPIRATORY (INHALATION) at 10:29

## 2024-08-25 RX ADMIN — LAMOTRIGINE 150 MG: 100 TABLET ORAL at 09:40

## 2024-08-25 RX ADMIN — IBUPROFEN 400 MG: 400 TABLET ORAL at 14:52

## 2024-08-25 RX ADMIN — DIVALPROEX SODIUM 250 MG: 250 TABLET, DELAYED RELEASE ORAL at 09:40

## 2024-08-25 RX ADMIN — GUAIFENESIN 600 MG: 600 TABLET, EXTENDED RELEASE ORAL at 20:18

## 2024-08-25 RX ADMIN — LAMOTRIGINE 150 MG: 100 TABLET ORAL at 00:10

## 2024-08-25 RX ADMIN — METHYLPREDNISOLONE SODIUM SUCCINATE 81.25 MG: 125 INJECTION, POWDER, FOR SOLUTION INTRAMUSCULAR; INTRAVENOUS at 14:12

## 2024-08-25 RX ADMIN — AZITHROMYCIN DIHYDRATE 250 MG: 250 TABLET ORAL at 18:25

## 2024-08-25 RX ADMIN — ESTROGENS, CONJUGATED 0.9 MG: 0.45 TABLET, FILM COATED ORAL at 09:41

## 2024-08-25 RX ADMIN — BENZONATATE 100 MG: 100 CAPSULE ORAL at 09:41

## 2024-08-25 RX ADMIN — METHYLPREDNISOLONE SODIUM SUCCINATE 81.25 MG: 125 INJECTION, POWDER, FOR SOLUTION INTRAMUSCULAR; INTRAVENOUS at 09:49

## 2024-08-25 RX ADMIN — IPRATROPIUM BROMIDE AND ALBUTEROL SULFATE 3 ML: .5; 3 SOLUTION RESPIRATORY (INHALATION) at 19:31

## 2024-08-25 RX ADMIN — FEXOFENADINE HCL 180 MG: 180 TABLET ORAL at 09:41

## 2024-08-25 RX ADMIN — BENZONATATE 100 MG: 100 CAPSULE ORAL at 21:31

## 2024-08-25 RX ADMIN — BENZONATATE 100 MG: 100 CAPSULE ORAL at 17:02

## 2024-08-25 RX ADMIN — SODIUM CHLORIDE, POTASSIUM CHLORIDE, SODIUM LACTATE AND CALCIUM CHLORIDE: 600; 310; 30; 20 INJECTION, SOLUTION INTRAVENOUS at 00:12

## 2024-08-25 RX ADMIN — DIVALPROEX SODIUM 250 MG: 250 TABLET, DELAYED RELEASE ORAL at 19:57

## 2024-08-25 RX ADMIN — METHYLPREDNISOLONE SODIUM SUCCINATE 81.25 MG: 125 INJECTION, POWDER, FOR SOLUTION INTRAMUSCULAR; INTRAVENOUS at 21:31

## 2024-08-25 RX ADMIN — DIVALPROEX SODIUM 250 MG: 250 TABLET, DELAYED RELEASE ORAL at 00:10

## 2024-08-25 RX ADMIN — LAMOTRIGINE 150 MG: 100 TABLET ORAL at 19:57

## 2024-08-25 RX ADMIN — PRAVASTATIN SODIUM 40 MG: 40 TABLET ORAL at 21:31

## 2024-08-25 RX ADMIN — CLOBAZAM 5 MG: 2.5 SUSPENSION ORAL at 20:22

## 2024-08-25 RX ADMIN — PIPERACILLIN AND TAZOBACTAM 4.5 G: 4; .5 INJECTION, POWDER, FOR SOLUTION INTRAVENOUS at 03:23

## 2024-08-25 RX ADMIN — PIPERACILLIN AND TAZOBACTAM 4.5 G: 4; .5 INJECTION, POWDER, FOR SOLUTION INTRAVENOUS at 09:54

## 2024-08-25 RX ADMIN — PRAVASTATIN SODIUM 40 MG: 40 TABLET ORAL at 00:10

## 2024-08-25 RX ADMIN — SODIUM CHLORIDE, POTASSIUM CHLORIDE, SODIUM LACTATE AND CALCIUM CHLORIDE: 600; 310; 30; 20 INJECTION, SOLUTION INTRAVENOUS at 17:06

## 2024-08-25 RX ADMIN — GUAIFENESIN 600 MG: 600 TABLET, EXTENDED RELEASE ORAL at 09:41

## 2024-08-25 ASSESSMENT — ACTIVITIES OF DAILY LIVING (ADL)
ADLS_ACUITY_SCORE: 23
ADLS_ACUITY_SCORE: 23
ADLS_ACUITY_SCORE: 21
ADLS_ACUITY_SCORE: 23
ADLS_ACUITY_SCORE: 21
ADLS_ACUITY_SCORE: 23
ADLS_ACUITY_SCORE: 21
ADLS_ACUITY_SCORE: 23
ADLS_ACUITY_SCORE: 21
ADLS_ACUITY_SCORE: 23
ADLS_ACUITY_SCORE: 18
ADLS_ACUITY_SCORE: 23
ADLS_ACUITY_SCORE: 23
ADLS_ACUITY_SCORE: 18
ADLS_ACUITY_SCORE: 35
ADLS_ACUITY_SCORE: 23

## 2024-08-25 NOTE — PROGRESS NOTES
RECEIVING UNIT ED HANDOFF REVIEW    ED Nurse Handoff Report was reviewed by: Fahad Ochoa RN on August 25, 2024 at 12:18 AM

## 2024-08-25 NOTE — PROVIDER NOTIFICATION
Dr. Sawant contacted re: patient having chest pressure/tightness. Pt states it is similar to yesterday but not as severe. Left midsternal area and radiates around left breast area, feels better when pressed on per patient. EKG and troponin checked. MD ordered ibuprofen for pain.

## 2024-08-25 NOTE — PLAN OF CARE
Goal Outcome Evaluation:    Assumed care at 1500. Tested pos for enterovirus, on droplets. VSS on 2L NC, softer bps. A&Ox4. SBA in room. From [place], here for family reunion. Changed from IMC status to medical tele. Denies pain. Using IS at bedside. Discharge pending.

## 2024-08-25 NOTE — PROGRESS NOTES
[facility]    Medicine Progress Note - Hospitalist Service    Date of Admission:  8/24/2024    Assessment & Plan   Windy Douglas is a 43 year old female visiting from [place] since 8/23/2024 presents with URI symptoms, dyspnea and cough.        URI symptoms         Cough, suspected early bronchitis          Concern for early sepsis (tachy, tachypnea, elevated lactic acid, leukocytosis)  Elevated lactic acid, resolved    Visiting from [place] for the state [facility] includes the [facility] and [facility]  [place] ill 8/22 with sore throat and nasal congestion  Went to the Count includes the [facility] 8/23 but continued to develop more of a cough and chest tightness so came to the ED    D dimer - 0.29  CXR - no infiltrate    For chest pain c/o - initial troponin negative,  d -dimer not elevated, ECG with nonspecific ST wave changes    Plan - repeat troponin and EKG later today    COVID, flu, rsv negative  Full viral panel requested this am and is PENDING    Lactic acid has normalized this am  Mild increase in WBC this am but did receive IV solumedrol doses    Will start mucinex and tessalon pearls today  Ok to restart duonebs prn     Addendum - 1250  ID consult placed on admission by admitting physician  Will cancel consult - spoke with Dr. Brown on the phone  Discontinue IV zosyn; start rocephin and continue azithromycin    Addendum - 1843    Patient tested positive for human rhinovirus  Clinical symptoms consistent with viral pneumonia    Acute respiratory distress  - improving    No clear hypoxia on admission  Remains on oxygen for respiratory distress  - will try to wean this am    No infiltrate on CXR  Started on IV zosyn, IV azithromycin on admisison  Awaiting resp viral panel    Is wheezing this am - will increase IV solumedrol dosing from daily to q8hr today  Duonebs  IS     3. Seizure disorder  Continue PTA Lamictal and Depakote      4. Menopausal secondary to hysterectomy  -May continue PTA Premarin     5.  Weakness d/t acute  "illness  Encourage up out of bed today and IS     DVT Prophylaxis: Pneumatic Compression Devices  Code Status: Full Code.        PPE Used:  Mask, gloves          Diet: Combination Diet Regular Diet Adult    DVT Prophylaxis: Enoxaparin (Lovenox) SQ  Arreola Catheter: Not present  Lines: None     Cardiac Monitoring: ACTIVE order. Indication: Tachyarrhythmias, acute (48 hours)  Code Status: Full Code      Clinically Significant Risk Factors Present on Admission          # Hypocalcemia: Lowest Ca = 8.6 mg/dL in last 2 days, will monitor and replace as appropriate                 # Obesity: Estimated body mass index is 32.56 kg/m  as calculated from the following:    Height as of this encounter: 1.702 m (5' 7\").    Weight as of this encounter: 94.3 kg (207 lb 14.3 oz).       # Asthma: noted on problem list              Disposition Plan     Medically Ready for Discharge: anticipated 1-2 days             Solange Sawant DO  Hospitalist Service  [facility]  Securely message with [employer] (more info)  Text page via [facility] Paging/Directory   ______________________________________________________________________    Interval History     D/w RN and seen in  room with  at bedside.    Chest still feeling tight.  Feeling ill and tired.  No N/V; still with non-productive and frequent cough.  Has not been out of bed much.  No HA    C/o sinus congestion.  Sore throat better    Has had difficulty with sinus infections in the past.    Was scheduled to fly back to [place] this am    Physical Exam   Vital Signs: Temp: 98  F (36.7  C) Temp src: Oral BP: 127/73 Pulse: 85   Resp: 17 SpO2: 92 % O2 Device: Nasal cannula Oxygen Delivery: 2 LPM  Weight: 207 lbs 14.3 oz    GEN:  Alert, oriented x 3, appears moderately ill, coughing.   Overall not in acute respiratory distress.  HEENT:  Normocephalic/atraumatic, no scleral icterus, no nasal discharge, mouth moist.  Nasal cannula in place  CV:  Regular rate and " rhythm, no murmur or JVD.  S1 + S2 noted,   LUNGS:  scattered end-exp wheeze throughout to ausc ant/lat/post bilaterally.   Slightly diminished air exchange at  the post bases bilaterally.   No clear rales or rhonchi  Symmetric chest rise on inhalation noted.  ABD:  Active bowel sounds, soft, non-tender/non-distended.  No guarding/rigidity.  EXT:  No significant pretibial edema.  No cyanosis.  No new joint synovitis noted.  SKIN:  Dry to touch, no new exanthems noted in the visualized areas.    Medical Decision Making       52 MINUTES SPENT BY ME on the date of service doing chart review, history, exam, documentation & further activities per the note.      Data   Medications   Current Facility-Administered Medications   Medication Dose Route Frequency Provider Last Rate Last Admin    lactated ringers infusion   Intravenous Continuous Mayte Perez  mL/hr at 08/25/24 0130 Rate Verify at 08/25/24 0130     Current Facility-Administered Medications   Medication Dose Route Frequency Provider Last Rate Last Admin    azithromycin (ZITHROMAX) tablet 250 mg  250 mg Oral Daily Mayte Perez MD        clobazam (ONFI) suspension 5 mg  5 mg Oral BID Fatimah Nagel, [facility]        divalproex sodium delayed-release (DEPAKOTE) DR tablet 250 mg  250 mg Oral BID Mayte Perez MD   250 mg at 08/25/24 0010    estrogen conj (PREMARIN) tablet 0.9 mg  0.9 mg Oral Daily Mayte Perez MD        fexofenadine (ALLEGRA) tablet 180 mg  180 mg Oral Daily Mayte Perez MD        [Held by provider] ipratropium - albuterol 0.5 mg/2.5 mg/3 mL (DUONEB) neb solution 3 mL  3 mL Nebulization 4x daily Mayte Perez MD        lamoTRIgine (LaMICtal) tablet 150 mg  150 mg Oral BID Mayte Perez MD   150 mg at 08/25/24 0010    levocetirizine (XYZAL) tablet 5 mg  5 mg Oral QPM Mayte Perez MD   5 mg at 08/25/24 0010    methylPREDNISolone Na Suc (solu-MEDROL) injection 81.25 mg  81.25 mg  Intravenous Q24H Mayte Perez MD        piperacillin-tazobactam (ZOSYN) 4.5 g vial to attach to  mL bag  4.5 g Intravenous Q6H Mayte Perez MD 0 mL/hr at 08/25/24 0003 4.5 g at 08/25/24 0323    pravastatin (PRAVACHOL) tablet 40 mg  40 mg Oral At Bedtime Mayte Perez MD   40 mg at 08/25/24 0010    sodium chloride (PF) 0.9% PF flush 3 mL  3 mL Intracatheter Q8H Mayte Perez MD        sodium chloride (PF) 0.9% PF flush 3 mL  3 mL Intracatheter Q8H Mayte Perez MD         Labs and Imaging results below reviewed today.  Recent Labs   Lab 08/25/24  0558 08/24/24  1423   WBC 14.0* 12.4*   HGB 12.2 14.7   HCT 36.4 42.9   MCV 93 92    241     Recent Labs   Lab 08/25/24  0558 08/24/24  1423    137   POTASSIUM 4.3 3.6   CHLORIDE 106 100   CO2 32* 24   ANIONGAP 2* 13   * 118*   BUN 7.7 13.5   CR 0.61 0.72   GFRESTIMATED >90 >90   LINDSAY 8.6* 9.0     7-Day Micro Results       Collected Updated Procedure Result Status      08/25/2024 1007 08/25/2024 1024 Respiratory Panel PCR [[id number]]   Swab from Nasopharyngeal    In process Component Value   No component results            08/24/2024 2208 08/24/2024 2303 Blood Culture Line, venous [[id number]]   Blood from Line, venous    In process Component Value   No component results               08/24/2024 1528 08/25/2024 0631 Blood Culture Line, venous [[id number]]   Blood from Line, venous    Preliminary result Component Value   Culture No growth after 12 hours  [P]                08/24/2024 1429 08/24/2024 1512 Symptomatic Influenza A/B, RSV, & SARS-CoV2 PCR (COVID-19) Nasopharyngeal [[id number]]    Swab from Nasopharyngeal    Final result Component Value   Influenza A PCR Negative   Influenza B PCR Negative   RSV PCR Negative   SARS CoV2 PCR Negative   NEGATIVE: SARS-CoV-2 (COVID-19) RNA not detected, presumed negative.                    Recent Results (from the past 24 hour(s))   Chest XR,  PA & LAT     Narrative    EXAM: XR CHEST 2 VIEWS  LOCATION: [facility]  DATE: 8/24/2024    INDICATION: cough, sob, left chest pain  COMPARISON: None.      Impression    IMPRESSION: Negative chest.

## 2024-08-25 NOTE — PLAN OF CARE
Problem: Adult Inpatient Plan of Care  Goal: Plan of Care Review  Description: The Plan of Care Review/Shift note should be completed every shift.  The Outcome Evaluation is a brief statement about your assessment that the patient is improving, declining, or no change.  This information will be displayed automatically on your shift  note.  Outcome: Progressing  Flowsheets (Taken 8/25/2024 7323)  Outcome Evaluation: Pt alert, oriented. SR on tele. Lungs with diffuse expiratory wheezes on auscultation--improved with nebs. 2 L NC needed to maintain SpO2 > 92%.IS to 750.  Some ROY. Tolerating diet. Up SBA to bathroom.  at bedside and supportive. Plan to continue steroids, nebs, and antibiotics (changed per ID recs).  Plan of Care Reviewed With:   patient   spouse  Overall Patient Progress: improving   Goal Outcome Evaluation:      Plan of Care Reviewed With: patient, spouse    Overall Patient Progress: improvingOverall Patient Progress: improving    Outcome Evaluation: Pt alert, oriented. SR on tele. Lungs with diffuse expiratory wheezes on auscultation--improved with nebs. 2 L NC needed to maintain SpO2 > 92%.IS to 750.  Some ROY. Tolerating diet. Up SBA to bathroom.  at bedside and supportive. Plan to continue steroids, nebs, and antibiotics (changed per ID recs).

## 2024-08-25 NOTE — PHARMACY-ADMISSION MEDICATION HISTORY
Pharmacist Admission Medication History    Admission medication history is complete. The information provided in this note is only as accurate as the sources available at the time of the update.    Information Source(s): Patient, Prescription bottles, and CareEverywhere/SureScripts via in-person    Pertinent Information:     Changes made to PTA medication list:  Added: all  Deleted: None  Changed: None    Allergies reviewed with patient and updates made in EHR: no    Medication History Completed By: Akil Aliheyder, RPH 8/24/2024 7:22 PM    PTA Med List   Medication Sig Last Dose    clobazam (ONFI) 10 MG tablet Take 5 mg by mouth 2 times daily. Takes 1/2 tab of 10 mg bid 8/24/2024 at am    divalproex sodium delayed-release (DEPAKOTE) 250 MG DR tablet Take 250 mg by mouth 2 times daily. 8/24/2024 at am    estrogen conj (PREMARIN) 0.9 MG tablet Take 0.9 mg by mouth daily. 8/24/2024    fexofenadine (ALLEGRA) 180 MG tablet Take 180 mg by mouth daily. 8/24/2024    lamoTRIgine (LAMICTAL) 150 MG tablet Take 150 mg by mouth 2 times daily. 8/24/2024 at am    levocetirizine (XYZAL) 5 MG tablet Take 5 mg by mouth every evening. 8/23/2024    pravastatin (PRAVACHOL) 40 MG tablet Take 40 mg by mouth at bedtime. 8/23/2024    SUMAtriptan (IMITREX) 50 MG tablet Take 50 mg by mouth at onset of headache for migraine. prn

## 2024-08-25 NOTE — PROVIDER NOTIFICATION
FYI: Pt tested pos for enterovirus, starting droplet precautions FYI: Pt tested pos for enterovirus, starting droplet precautions

## 2024-08-25 NOTE — PLAN OF CARE
Goal Outcome Evaluation:  -~Care plan-end of shift note: [id number]   -~Orientation/Mentation:A&O x4  -~VS: VSS on 2L/NC sat 90s  -~LS/Pulm:Ls clear  -~Tele/Cardiac:SR  -~GI:WDL  -~:WDL  -~Pain:denies  -~Mobility:Up w/SBA  -~Skin:intact  -~Diet:regular  -~Lines/IVS: LR iv gtts at 150 ml/hr  -~Safety/Concern:calls appropriately  -~Aggression color:green  -~Plan/Shift summary/Goals:Overnight ED admission transfer to [facility]. Denies SOB/CP. Lactic acid 6.1->4.6->1.3. Plans for infectious disease & SW consult.

## 2024-08-26 LAB
ANION GAP SERPL CALCULATED.3IONS-SCNC: 11 MMOL/L (ref 7–15)
BUN SERPL-MCNC: 14.1 MG/DL (ref 6–20)
CALCIUM SERPL-MCNC: 8.8 MG/DL (ref 8.8–10.4)
CHLORIDE SERPL-SCNC: 103 MMOL/L (ref 98–107)
CREAT SERPL-MCNC: 0.6 MG/DL (ref 0.51–0.95)
EGFRCR SERPLBLD CKD-EPI 2021: >90 ML/MIN/1.73M2
ERYTHROCYTE [DISTWIDTH] IN BLOOD BY AUTOMATED COUNT: 13.2 % (ref 10–15)
GLUCOSE SERPL-MCNC: 137 MG/DL (ref 70–99)
HCO3 SERPL-SCNC: 25 MMOL/L (ref 22–29)
HCT VFR BLD AUTO: 35.4 % (ref 35–47)
HGB BLD-MCNC: 11.7 G/DL (ref 11.7–15.7)
MCH RBC QN AUTO: 30.6 PG (ref 26.5–33)
MCHC RBC AUTO-ENTMCNC: 33.1 G/DL (ref 31.5–36.5)
MCV RBC AUTO: 93 FL (ref 78–100)
PLATELET # BLD AUTO: 235 10E3/UL (ref 150–450)
POTASSIUM SERPL-SCNC: 3.8 MMOL/L (ref 3.4–5.3)
RBC # BLD AUTO: 3.82 10E6/UL (ref 3.8–5.2)
SODIUM SERPL-SCNC: 139 MMOL/L (ref 135–145)
WBC # BLD AUTO: 21.2 10E3/UL (ref 4–11)

## 2024-08-26 PROCEDURE — 250N000011 HC RX IP 250 OP 636: Performed by: INTERNAL MEDICINE

## 2024-08-26 PROCEDURE — 94640 AIRWAY INHALATION TREATMENT: CPT | Mod: 76

## 2024-08-26 PROCEDURE — 250N000013 HC RX MED GY IP 250 OP 250 PS 637: Performed by: HOSPITALIST

## 2024-08-26 PROCEDURE — 36415 COLL VENOUS BLD VENIPUNCTURE: CPT | Performed by: INTERNAL MEDICINE

## 2024-08-26 PROCEDURE — 85027 COMPLETE CBC AUTOMATED: CPT | Performed by: INTERNAL MEDICINE

## 2024-08-26 PROCEDURE — 250N000013 HC RX MED GY IP 250 OP 250 PS 637

## 2024-08-26 PROCEDURE — 999N000157 HC STATISTIC RCP TIME EA 10 MIN

## 2024-08-26 PROCEDURE — 99232 SBSQ HOSP IP/OBS MODERATE 35: CPT | Performed by: INTERNAL MEDICINE

## 2024-08-26 PROCEDURE — 80048 BASIC METABOLIC PNL TOTAL CA: CPT | Performed by: INTERNAL MEDICINE

## 2024-08-26 PROCEDURE — 250N000012 HC RX MED GY IP 250 OP 636 PS 637: Performed by: INTERNAL MEDICINE

## 2024-08-26 PROCEDURE — 250N000009 HC RX 250: Performed by: INTERNAL MEDICINE

## 2024-08-26 PROCEDURE — 250N000013 HC RX MED GY IP 250 OP 250 PS 637: Performed by: INTERNAL MEDICINE

## 2024-08-26 PROCEDURE — 94640 AIRWAY INHALATION TREATMENT: CPT

## 2024-08-26 PROCEDURE — 120N000001 HC R&B MED SURG/OB

## 2024-08-26 RX ORDER — PREDNISONE 20 MG/1
40 TABLET ORAL DAILY
Status: DISCONTINUED | OUTPATIENT
Start: 2024-08-26 | End: 2024-08-27 | Stop reason: HOSPADM

## 2024-08-26 RX ORDER — METHYLPREDNISOLONE SODIUM SUCCINATE 125 MG/2ML
INJECTION, POWDER, LYOPHILIZED, FOR SOLUTION INTRAMUSCULAR; INTRAVENOUS
Status: COMPLETED
Start: 2024-08-26 | End: 2024-08-26

## 2024-08-26 RX ORDER — FUROSEMIDE 10 MG/ML
20 INJECTION INTRAMUSCULAR; INTRAVENOUS ONCE
Status: COMPLETED | OUTPATIENT
Start: 2024-08-26 | End: 2024-08-26

## 2024-08-26 RX ADMIN — LAMOTRIGINE 150 MG: 100 TABLET ORAL at 08:49

## 2024-08-26 RX ADMIN — FEXOFENADINE HCL 180 MG: 180 TABLET ORAL at 09:49

## 2024-08-26 RX ADMIN — PRAVASTATIN SODIUM 40 MG: 40 TABLET ORAL at 20:14

## 2024-08-26 RX ADMIN — IPRATROPIUM BROMIDE AND ALBUTEROL SULFATE 3 ML: .5; 3 SOLUTION RESPIRATORY (INHALATION) at 20:44

## 2024-08-26 RX ADMIN — BENZONATATE 100 MG: 100 CAPSULE ORAL at 20:14

## 2024-08-26 RX ADMIN — IPRATROPIUM BROMIDE AND ALBUTEROL SULFATE 3 ML: .5; 3 SOLUTION RESPIRATORY (INHALATION) at 07:43

## 2024-08-26 RX ADMIN — LAMOTRIGINE 150 MG: 100 TABLET ORAL at 20:08

## 2024-08-26 RX ADMIN — LEVOCETIRIZINE DIHYDROCHLORIDE 5 MG: 5 TABLET ORAL at 20:08

## 2024-08-26 RX ADMIN — AZITHROMYCIN DIHYDRATE 250 MG: 250 TABLET ORAL at 18:23

## 2024-08-26 RX ADMIN — FUROSEMIDE 20 MG: 10 INJECTION, SOLUTION INTRAVENOUS at 14:17

## 2024-08-26 RX ADMIN — PREDNISONE 40 MG: 20 TABLET ORAL at 16:20

## 2024-08-26 RX ADMIN — CLOBAZAM 5 MG: 2.5 SUSPENSION ORAL at 09:50

## 2024-08-26 RX ADMIN — GUAIFENESIN 600 MG: 600 TABLET, EXTENDED RELEASE ORAL at 08:50

## 2024-08-26 RX ADMIN — DIVALPROEX SODIUM 250 MG: 250 TABLET, DELAYED RELEASE ORAL at 20:10

## 2024-08-26 RX ADMIN — ESTROGENS, CONJUGATED 0.9 MG: 0.45 TABLET, FILM COATED ORAL at 08:49

## 2024-08-26 RX ADMIN — CLOBAZAM 5 MG: 2.5 SUSPENSION ORAL at 20:09

## 2024-08-26 RX ADMIN — IPRATROPIUM BROMIDE AND ALBUTEROL SULFATE 3 ML: .5; 3 SOLUTION RESPIRATORY (INHALATION) at 12:30

## 2024-08-26 RX ADMIN — METHYLPREDNISOLONE SODIUM SUCCINATE 81.25 MG: 125 INJECTION, POWDER, FOR SOLUTION INTRAMUSCULAR; INTRAVENOUS at 12:50

## 2024-08-26 RX ADMIN — METHYLPREDNISOLONE SODIUM SUCCINATE 81.25 MG: 125 INJECTION, POWDER, FOR SOLUTION INTRAMUSCULAR; INTRAVENOUS at 06:50

## 2024-08-26 RX ADMIN — GUAIFENESIN 600 MG: 600 TABLET, EXTENDED RELEASE ORAL at 20:07

## 2024-08-26 RX ADMIN — BENZONATATE 100 MG: 100 CAPSULE ORAL at 16:20

## 2024-08-26 RX ADMIN — IPRATROPIUM BROMIDE AND ALBUTEROL SULFATE 3 ML: .5; 3 SOLUTION RESPIRATORY (INHALATION) at 16:35

## 2024-08-26 RX ADMIN — DIVALPROEX SODIUM 250 MG: 250 TABLET, DELAYED RELEASE ORAL at 08:49

## 2024-08-26 RX ADMIN — BENZONATATE 100 MG: 100 CAPSULE ORAL at 08:50

## 2024-08-26 ASSESSMENT — ACTIVITIES OF DAILY LIVING (ADL)
ADLS_ACUITY_SCORE: 23

## 2024-08-26 NOTE — PLAN OF CARE
"Patient Name: Maria  MRN: [id number]  Date of Admission: 8/24/2024  Reason for Admission: SOB; Human Rhinovirus  Level of Care: Med/surg    Vitals:   BP Readings from Last 1 Encounters:  08/26/24 : (!) 141/76    Pulse Readings from Last 1 Encounters:  08/26/24 : 90    Wt Readings from Last 1 Encounters:  08/25/24 : 94.3 kg (207 lb 14.3 oz)    Ht Readings from Last 1 Encounters:  08/25/24 : 1.702 m (5' 7\")    Estimated body mass index is 32.56 kg/m  as calculated from the following:    Height as of this encounter: 1.702 m (5' 7\").    Weight as of this encounter: 94.3 kg (207 lb 14.3 oz).  Temp Readings from Last 1 Encounters:  08/26/24 : 98.4  F (36.9  C)      Pain: Pain goal 0 Pain Rating 0 Effective pain medication/regimen none    CV Surgery Patient: No    Assessment    Resp: 20  Telemetry: SR  Neuro: Intact  GI/: Continent.  Skin/Wounds: None  Lines/Drains: PIV  Activity: SBA  Sleep: good  Abnormal Labs: SpO2: 90-92% RA    Aggression Stop Light: Green          Patient Care Plan: discharge tomorrow Summary: Pt. Admitted with SOB (positive for Human Rhinovirus).  A & O x 4.  Tremors at baseline.  Denies pain. Pain Management Interventions: medication (see MAR).  Productive cough; exp wheezing.  Tele:SR.  SpO2: 90% (off oxygen).  Assistance Provided: assistance, stand-by.  Ambulated with patient.  Regular diet.  Droplet precautions maintained. CTM.                              "

## 2024-08-26 NOTE — PLAN OF CARE
Problem: Adult Inpatient Plan of Care  Goal: Plan of Care Review  Description: The Plan of Care Review/Shift note should be completed every shift.  The Outcome Evaluation is a brief statement about your assessment that the patient is improving, declining, or no change.  This information will be displayed automatically on your shift  note.  Flowsheets (Taken 8/26/2024 2590)  Plan of Care Reviewed With:   patient   spouse  Overall Patient Progress: improving   Goal Outcome Evaluation:      Plan of Care Reviewed With: patient, spouse    Overall Patient Progress: improvingOverall Patient Progress: improving    Patient is A&O x 4, VSS, SR to ST. Denies pain, reports some weakness with activities. Lasix given during the day shift,LS still wheeze. Up with SBA.  Continue with POC.

## 2024-08-26 NOTE — PLAN OF CARE
"Goal Outcome Evaluation:    Patient Name: Maria  MRN: [id number]  Date of Admission: 8/24/2024  Reason for Admission: URI symptoms, human rhinovirus +  Level of Care: med surg    Vitals:   BP Readings from Last 1 Encounters:   08/26/24 124/66     Pulse Readings from Last 1 Encounters:   08/26/24 72     Wt Readings from Last 1 Encounters:   08/25/24 94.3 kg (207 lb 14.3 oz)     Ht Readings from Last 1 Encounters:   08/25/24 1.702 m (5' 7\")     Estimated body mass index is 32.56 kg/m  as calculated from the following:    Height as of this encounter: 1.702 m (5' 7\").    Weight as of this encounter: 94.3 kg (207 lb 14.3 oz).  Temp Readings from Last 1 Encounters:   08/26/24 98.4  F (36.9  C)       Pain: denies    CV Surgery Patient: No    Assessment    Resp: ROY, Expiratory wheezes, frequnt congested cough  Telemetry: SR/ST  Neuro: WDL  GI/: WDL  Skin/Wounds: WDL  Lines/Drains: PIV x1 SL  Activity: SBA  Sleep: good  Abnormal Labs: WBC up to 21.2 from 14    Aggression Stop Light: Green          Patient Care Plan: Steroids, nebs, abx, IS, increase activity as tolerated.    "

## 2024-08-26 NOTE — PROGRESS NOTES
[facility]    Medicine Progress Note - Hospitalist Service    Date of Admission:  8/24/2024    Assessment & Plan      Windy Douglas is a 43 year old female visiting from [place] since 8/23/2024 presents with URI symptoms, dyspnea and cough.        URI symptoms         Cough, suspected early bronchitis          Concern for early sepsis (tachy, tachypnea, elevated lactic acid, leukocytosis)  Elevated lactic acid, resolved     Visiting from [place] for the state [facility] to [facility] and [facility]  [place] ill 8/22 with sore throat and nasal congestion  Went to the [facility]ending sale to [facility] 8/23 but continued to develop more of a cough and chest tightness so came to the ED     D dimer - 0.29  CXR - no infiltrate  For chest pain c/o - initial troponin negative,  d -dimer not elevated, ECG with nonspecific ST wave change     COVID, flu, rsv negative  Full viral panel positive for human rhinovirus.        Acute respiratory distress  - improving     No clear hypoxia on admission  -Patient has been titrated and weaned off of oxygen to maintain oxygen saturation above 90%  -Viral syndrome in the setting of positive human rhinovirus leading to above  -Continue nebulizer therapy  -Reduce dose of IV steroid to oral prednisone 40 mg daily  -Ambulate patient and improvement to be monitored overnight and likely discharge tomorrow.  Patient started on oral azithromycin to be completed after 4 days to be continued.  -Discussed about possibility of following in pulmonary clinic after reaching home and the acute episode improves to assess for PFTs with the possible history of asthma according to patient.     3. Seizure disorder  Continue PTA Lamictal and Depakote      4. Menopausal secondary to hysterectomy  -May continue PTA Premarin      5.  Weakness d/t acute illness  Encourage ambulation in hallway.              Diet: Combination Diet Regular Diet Adult    DVT Prophylaxis: Pneumatic Compression Devices  Arreola Catheter: Not present  Lines: None    "  Cardiac Monitoring: ACTIVE order. Indication: Tachyarrhythmias, acute (48 hours)  Code Status: Full Code      Clinically Significant Risk Factors          # Hypocalcemia: Lowest Ca = 8.6 mg/dL in last 2 days, will monitor and replace as appropriate                   # Obesity: Estimated body mass index is 32.56 kg/m  as calculated from the following:    Height as of this encounter: 1.702 m (5' 7\").    Weight as of this encounter: 94.3 kg (207 lb 14.3 oz)., PRESENT ON ADMISSION     # Asthma: noted on problem list              Disposition Plan     Medically Ready for Discharge: Anticipated Tomorrow             Abdullahi Klein MD  Hospitalist Service  [facility]  Securely message with [employer] (more info)  Text page via [employer] Paging/Directory   ______________________________________________________________________    Interval History   Patient is sitting up in chair, patient's spouse at bedside, continues to have cough with expectoration, also symptoms of severe coughing spells when she takes a deep breath.  Patient denies any fever or chills overnight.  Patient appears to be tremulous and flushed.  Overall patient feels that she has baseline tremors to begin with but this is much worse especially after using albuterol nebulizer therapy.  We also discussed about steroid use, patient does not quite see a difference if it is related to the albuterol versus the steroid but overall seems to be a little jittery.  Oxygen needs have been slowly improving. Patient is hopeful for discharge so that she can travel back to [place]    Physical Exam   Vital Signs: Temp: 98.4  F (36.9  C) Temp src: Oral BP: (!) 141/76 Pulse: 90     SpO2: 92 % O2 Device: Nasal cannula Oxygen Delivery: 1 LPM  Weight: 207 lbs 14.3 oz    Constitutional: Awake, alert, cooperative, moderate apparent respiratory distress  Respiratory: Decreased bilateral air entry, seems very restricted, no crackles noted but upper airway rhonchi " present.  Cardiovascular: Regular rate and rhythm, normal S1 and S2, and no murmur noted  GI: Normal bowel sounds, soft, non-distended, non-tender  Skin/Integumen: No rashes, no cyanosis, no edema  Other: Alert, oriented x 3, mild tremors noticed overall    Medical Decision Making

## 2024-08-27 VITALS
OXYGEN SATURATION: 90 % | HEART RATE: 100 BPM | DIASTOLIC BLOOD PRESSURE: 87 MMHG | BODY MASS INDEX: 32.63 KG/M2 | WEIGHT: 207.89 LBS | RESPIRATION RATE: 18 BRPM | TEMPERATURE: 98 F | SYSTOLIC BLOOD PRESSURE: 145 MMHG | HEIGHT: 67 IN

## 2024-08-27 PROCEDURE — 250N000013 HC RX MED GY IP 250 OP 250 PS 637: Performed by: INTERNAL MEDICINE

## 2024-08-27 PROCEDURE — 94640 AIRWAY INHALATION TREATMENT: CPT

## 2024-08-27 PROCEDURE — 99239 HOSP IP/OBS DSCHRG MGMT >30: CPT | Performed by: INTERNAL MEDICINE

## 2024-08-27 PROCEDURE — 250N000012 HC RX MED GY IP 250 OP 636 PS 637: Performed by: INTERNAL MEDICINE

## 2024-08-27 PROCEDURE — 999N000157 HC STATISTIC RCP TIME EA 10 MIN

## 2024-08-27 PROCEDURE — 250N000013 HC RX MED GY IP 250 OP 250 PS 637: Performed by: HOSPITALIST

## 2024-08-27 PROCEDURE — 94640 AIRWAY INHALATION TREATMENT: CPT | Mod: 76

## 2024-08-27 PROCEDURE — 250N000013 HC RX MED GY IP 250 OP 250 PS 637

## 2024-08-27 PROCEDURE — 250N000009 HC RX 250: Performed by: INTERNAL MEDICINE

## 2024-08-27 RX ORDER — GUAIFENESIN 600 MG/1
600 TABLET, EXTENDED RELEASE ORAL 2 TIMES DAILY
Qty: 20 TABLET | Refills: 0 | Status: SHIPPED | OUTPATIENT
Start: 2024-08-27 | End: 2024-09-06

## 2024-08-27 RX ORDER — ALBUTEROL SULFATE 90 UG/1
2 AEROSOL, METERED RESPIRATORY (INHALATION) EVERY 6 HOURS PRN
Qty: 18 G | Refills: 0 | Status: SHIPPED | OUTPATIENT
Start: 2024-08-27

## 2024-08-27 RX ORDER — PREDNISONE 20 MG/1
40 TABLET ORAL DAILY
Qty: 10 TABLET | Refills: 0 | Status: SHIPPED | OUTPATIENT
Start: 2024-08-27 | End: 2024-09-01

## 2024-08-27 RX ADMIN — DIVALPROEX SODIUM 250 MG: 250 TABLET, DELAYED RELEASE ORAL at 09:19

## 2024-08-27 RX ADMIN — FEXOFENADINE HCL 180 MG: 180 TABLET ORAL at 09:27

## 2024-08-27 RX ADMIN — ESTROGENS, CONJUGATED 0.9 MG: 0.45 TABLET, FILM COATED ORAL at 09:18

## 2024-08-27 RX ADMIN — BENZONATATE 100 MG: 100 CAPSULE ORAL at 09:19

## 2024-08-27 RX ADMIN — IPRATROPIUM BROMIDE AND ALBUTEROL SULFATE 3 ML: .5; 3 SOLUTION RESPIRATORY (INHALATION) at 10:48

## 2024-08-27 RX ADMIN — PREDNISONE 40 MG: 20 TABLET ORAL at 09:18

## 2024-08-27 RX ADMIN — CLOBAZAM 5 MG: 2.5 SUSPENSION ORAL at 11:40

## 2024-08-27 RX ADMIN — LAMOTRIGINE 150 MG: 100 TABLET ORAL at 09:19

## 2024-08-27 RX ADMIN — IPRATROPIUM BROMIDE AND ALBUTEROL SULFATE 3 ML: .5; 3 SOLUTION RESPIRATORY (INHALATION) at 07:20

## 2024-08-27 ASSESSMENT — ACTIVITIES OF DAILY LIVING (ADL)
ADLS_ACUITY_SCORE: 23
ADLS_ACUITY_SCORE: 24
ADLS_ACUITY_SCORE: 23

## 2024-08-27 NOTE — PLAN OF CARE
"Patient Name: Windy Douglas  MRN: [id number]  Date of Admission: 8/24/2024  Reason for Admission: SOB, Human Rhinovirus  Level of Care: Med-surg    Vitals:   BP Readings from Last 1 Encounters:  08/27/24 : 142/84    Pulse Readings from Last 1 Encounters:  08/27/24 : 74    Wt Readings from Last 1 Encounters:  08/25/24 : 94.3 kg (207 lb 14.3 oz)    Ht Readings from Last 1 Encounters:  08/25/24 : 1.702 m (5' 7\")    Estimated body mass index is 32.56 kg/m  as calculated from the following:    Height as of this encounter: 1.702 m (5' 7\").    Weight as of this encounter: 94.3 kg (207 lb 14.3 oz).  Temp Readings from Last 1 Encounters:  08/27/24 : 98.4  F (36.9  C) (Oral)    Pain: Pain goal 0 Pain Rating 0 Effective pain medication/regimen: pain controlled, no PRNs given     CV Surgery Patient: No    Assessment    Resp: ROY, on RA. Expiratory wheezes.  Telemetry: NSR-ST.  Neuro: A&Ox4, WDL.  GI/: Adequate UOP, no BM during this shift.   Skin/Wounds: WDL  Lines/Drains: R PIV SL.  Activity: SBA  Sleep: Able to sleep periodically thoughout the shift.     Aggression Stop Light: Green          Patient Care Plan: Progressing         "

## 2024-08-27 NOTE — PROGRESS NOTES
Patient has been assessed for Home Oxygen needs. Oxygen readings:    *Pulse oximetry (SpO2) = 92% on room air at rest while awake.    *SpO2 improved to % on liters/minute at rest.    *SpO2 = 88% on room air during activity/with exercise.    *SpO2 improved to % on liters/minute during activity/with exercise.

## 2024-08-27 NOTE — PLAN OF CARE
"Patient Name: Maria  MRN: [id number]  Date of Admission: 8/24/2024  Reason for Admission: Asthma exacerbation  Level of Care: Acute    Vitals:   BP Readings from Last 1 Encounters:   08/27/24 (!) 145/87     Pulse Readings from Last 1 Encounters:   08/27/24 100     Wt Readings from Last 1 Encounters:   08/25/24 94.3 kg (207 lb 14.3 oz)     Ht Readings from Last 1 Encounters:   08/25/24 1.702 m (5' 7\")     Estimated body mass index is 32.56 kg/m  as calculated from the following:    Height as of this encounter: 1.702 m (5' 7\").    Weight as of this encounter: 94.3 kg (207 lb 14.3 oz).  Temp Readings from Last 1 Encounters:   08/27/24 98  F (36.7  C) (Oral)       Pain: Pain goal 0 Pain Rating 0 Effective pain medication/regimen n/a    CV Surgery Patient: No    Assessment    Resp: RA, dyspnea with activity, LS clear diminished  Telemetry: NSR with freq PVC's  Neuro: A/o x 4, neuro intact  GI/: +BS + void, denies n/v, tolerating regular diet  Skin/Wounds: Edema BLE +1-2  Lines/Drains: L PIV removed prior to discharge  Activity: Walking in [place] with SBA  Abnormal Labs: WBC 21.2    Aggression Stop Light: Green          Patient Care Plan: Home O2 assessment done- does not qualify of home O2. Discharge home today. Reviewed AVS including new prescriptions and follow up appointments. Pt discharged at 1150 with .      Goal Outcome Evaluation:         Problem: Adult Inpatient Plan of Care  Goal: Plan of Care Review  Description: The Plan of Care Review/Shift note should be completed every shift.  The Outcome Evaluation is a brief statement about your assessment that the patient is improving, declining, or no change.  This information will be displayed automatically on your shift  note.  Outcome: Met  Goal: Patient-Specific Goal (Individualized)  Description: You can add care plan individualizations to a care plan. Examples of Individualization might be:  \"Parent requests to be called daily at 9am for status\", \"I " "have a hard time hearing out of my right ear\", or \"Do not touch me to wake me up as it startles  me\".  Outcome: Met  Goal: Absence of Hospital-Acquired Illness or Injury  Outcome: Met  Intervention: Identify and Manage Fall Risk  Recent Flowsheet Documentation  Taken 8/27/2024 0900 by Adelita Kelly RN  Safety Promotion/Fall Prevention:   assistive device/personal items within reach   clutter free environment maintained   nonskid shoes/slippers when out of bed   patient and family education   safety round/check completed  Intervention: Prevent Infection  Recent Flowsheet Documentation  Taken 8/27/2024 0900 by Adelita Kelly RN  Infection Prevention:   single patient room provided   hand hygiene promoted   personal protective equipment utilized  Goal: Optimal Comfort and Wellbeing  Outcome: Met  Goal: Readiness for Transition of Care  Outcome: Met     Problem: Gas Exchange Impaired  Goal: Optimal Gas Exchange  Outcome: Met                    "

## 2024-08-27 NOTE — DISCHARGE SUMMARY
"[facility]  Hospitalist Discharge Summary      Date of Admission:  8/24/2024  Date of Discharge:  8/27/2024 11:49 AM  Discharging Provider: Abdullahi Klein MD  Discharge Service: Hospitalist Service    Discharge Diagnoses   Cough ,URI    Clinically Significant Risk Factors     # Obesity: Estimated body mass index is 32.56 kg/m  as calculated from the following:    Height as of this encounter: 1.702 m (5' 7\").    Weight as of this encounter: 94.3 kg (207 lb 14.3 oz).       Follow-ups Needed After Discharge   Follow-up Appointments     Follow-up and recommended labs and tests       Follow up with primary care provider, Physician No Ref-Primary, within 7   days for hospital follow- up.  The following labs/tests are recommended:   cbc.            Unresulted Labs Ordered in the Past 30 Days of this Admission       Date and Time Order Name Status Description    8/24/2024 10:02 PM Blood Culture Line, venous Preliminary     8/24/2024  2:52 PM Blood Culture Line, venous Preliminary         These results will be followed up by PCP    Discharge Disposition   Discharged to home  Condition at discharge: Stable    Hospital Course   Windy Douglas is a 43 year old female visiting from [place] since 8/23/2024 presents with URI symptoms, dyspnea and cough.        URI symptoms         Cough, suspected early bronchitis          Concern for early sepsis (tachy, tachypnea, elevated lactic acid, leukocytosis) resolved  Elevated lactic acid, resolved     Visiting from [place] for the [facility] and [facility]  [place] ill 8/22 with sore throat and nasal congestion  Went to the [facility] 8/23 but continued to develop more of a cough and chest tightness so came to the ED     D dimer - 0.29  CXR - no infiltrate  For chest pain c/o - initial troponin negative,  d -dimer not elevated, ECG with nonspecific ST wave change     COVID, flu, rsv negative  Full viral panel positive for human rhinovirus.        Acute respiratory " distress  - resolved     -Viral syndrome in the setting of positive human rhinovirus leading to above  Patient started on oral azithromycin completed at the time of discharge  -PO prednisone 40 mg for 5 days at discharge  -Discussed about possibility of following in pulmonary clinic after reaching home and the acute episode improves to assess for PFTs with the possible history of asthma according to patient.     3. Seizure disorder  Continue PTA Lamictal and Depakote      4. Menopausal secondary to hysterectomy  -May continue PTA Premarin              Consultations This Hospital Stay   INFECTIOUS DISEASES IP CONSULT  CARE MANAGEMENT / SOCIAL WORK IP CONSULT    Code Status   Prior    Time Spent on this Encounter   I, Abdullahi Klein MD, personally saw the patient today and spent greater than 30 minutes discharging this patient.       Abdullahi Klein MD  [facility]  [address] [place] [address]  Phone: [phone]  ______________________________________________________________________    Physical Exam   Vital Signs: Temp: 98  F (36.7  C) Temp src: Oral BP: (!) 145/87 Pulse: 100   Resp: 18 SpO2: 90 % O2 Device: None (Room air)    Weight: 207 lbs 14.3 oz  Constitutional: Awake, alert, cooperative, no apparent distress  Respiratory: mild upper airway rhonchi +  Cardiovascular: Regular rate and rhythm, normal S1 and S2, and no murmur noted  GI: Normal bowel sounds, soft, non-distended, non-tender         Primary Care Physician   Physician No Ref-Primary    Discharge Orders      Reason for your hospital stay    Viral pneumonia     Follow-up and recommended labs and tests     Follow up with primary care provider, Physician No Ref-Primary, within 7 days for hospital follow- up.  The following labs/tests are recommended: cbc.     Activity    Your activity upon discharge: activity as tolerated     Diet    Follow this diet upon discharge: Regular Diet Adult       Significant Results  and Procedures   Results for orders placed or performed during the hospital encounter of 08/24/24   Chest XR,  PA & LAT    Narrative    EXAM: XR CHEST 2 VIEWS  LOCATION: [facility]  DATE: 8/24/2024    INDICATION: cough, sob, left chest pain  COMPARISON: None.      Impression    IMPRESSION: Negative chest.       Discharge Medications   Discharge Medication List as of 8/27/2024 10:57 AM        START taking these medications    Details   albuterol (PROAIR HFA/PROVENTIL HFA/VENTOLIN HFA) 108 (90 Base) MCG/ACT inhaler Inhale 2 puffs into the lungs every 6 hours as needed for shortness of breath, wheezing or cough., Disp-18 g, R-0, E-PrescribePharmacy may dispense brand covered by insurance (Proair, or proventil or ventolin or generic albuterol inhaler)      guaiFENesin (MUCINEX) 600 MG 12 hr tablet Take 1 tablet (600 mg) by mouth 2 times daily for 10 days., Disp-20 tablet, R-0, E-Prescribe      predniSONE (DELTASONE) 20 MG tablet Take 2 tablets (40 mg) by mouth daily for 5 days., Disp-10 tablet, R-0, E-Prescribe           CONTINUE these medications which have NOT CHANGED    Details   clobazam (ONFI) 10 MG tablet Take 5 mg by mouth 2 times daily. Takes 1/2 tab of 10 mg bid, Historical      divalproex sodium delayed-release (DEPAKOTE) 250 MG DR tablet Take 250 mg by mouth 2 times daily., Historical      estrogen conj (PREMARIN) 0.9 MG tablet Take 0.9 mg by mouth daily., Historical      fexofenadine (ALLEGRA) 180 MG tablet Take 180 mg by mouth daily., Historical      lamoTRIgine (LAMICTAL) 150 MG tablet Take 150 mg by mouth 2 times daily., Historical      levocetirizine (XYZAL) 5 MG tablet Take 5 mg by mouth every evening., Historical      pravastatin (PRAVACHOL) 40 MG tablet Take 40 mg by mouth at bedtime., Historical      SUMAtriptan (IMITREX) 50 MG tablet Take 50 mg by mouth at onset of headache for migraine., Historical           Allergies   Allergies   Allergen Reactions    Codeine      Doxycycline     Seasonal Allergies     Strawberry (Diagnostic)     Zoloft [Sertraline]

## 2024-08-29 LAB — BACTERIA BLD CULT: NO GROWTH

## 2024-08-30 LAB — BACTERIA BLD CULT: NO GROWTH

## 2024-10-06 ENCOUNTER — HEALTH MAINTENANCE LETTER (OUTPATIENT)
Age: 44
End: 2024-10-06